# Patient Record
Sex: FEMALE | Race: WHITE | Employment: OTHER | URBAN - METROPOLITAN AREA
[De-identification: names, ages, dates, MRNs, and addresses within clinical notes are randomized per-mention and may not be internally consistent; named-entity substitution may affect disease eponyms.]

---

## 2017-04-27 ENCOUNTER — TELEPHONE (OUTPATIENT)
Dept: INTERNAL MEDICINE CLINIC | Age: 77
End: 2017-04-27

## 2017-05-01 ENCOUNTER — OFFICE VISIT (OUTPATIENT)
Dept: INTERNAL MEDICINE CLINIC | Age: 77
End: 2017-05-01

## 2017-05-01 ENCOUNTER — HOSPITAL ENCOUNTER (OUTPATIENT)
Dept: CT IMAGING | Age: 77
Discharge: HOME | End: 2017-05-01
Attending: INTERNAL MEDICINE | Admitting: INTERNAL MEDICINE

## 2017-05-01 VITALS
BODY MASS INDEX: 21.88 KG/M2 | WEIGHT: 119.6 LBS | DIASTOLIC BLOOD PRESSURE: 62 MMHG | TEMPERATURE: 98.1 F | SYSTOLIC BLOOD PRESSURE: 114 MMHG

## 2017-05-01 DIAGNOSIS — R10.12 LUQ PAIN: ICD-10-CM

## 2017-05-01 DIAGNOSIS — R10.13 EPIGASTRIC PAIN: Primary | ICD-10-CM

## 2017-05-01 DIAGNOSIS — R10.12 LUQ PAIN: Primary | ICD-10-CM

## 2017-05-01 DIAGNOSIS — R10.12 LEFT UPPER QUADRANT PAIN: ICD-10-CM

## 2017-05-01 LAB
A/G RATIO: 1.7 (ref 1.1–2.2)
ALBUMIN SERPL-MCNC: 4 G/DL (ref 3.4–5)
ALP BLD-CCNC: 57 U/L (ref 40–129)
ALT SERPL-CCNC: 18 U/L (ref 10–40)
AMYLASE: 107 U/L (ref 25–115)
ANION GAP SERPL CALCULATED.3IONS-SCNC: 9 MMOL/L (ref 3–16)
AST SERPL-CCNC: 17 U/L (ref 15–37)
BASOPHILS ABSOLUTE: 0.1 K/UL (ref 0–0.2)
BASOPHILS RELATIVE PERCENT: 1 %
BILIRUB SERPL-MCNC: 0.4 MG/DL (ref 0–1)
BUN BLDV-MCNC: 13 MG/DL (ref 7–20)
CALCIUM SERPL-MCNC: 9.4 MG/DL (ref 8.3–10.6)
CHLORIDE BLD-SCNC: 100 MMOL/L (ref 99–110)
CO2: 30 MMOL/L (ref 21–32)
CREAT SERPL-MCNC: 1 MG/DL (ref 0.6–1.2)
EOSINOPHILS ABSOLUTE: 0.3 K/UL (ref 0–0.6)
EOSINOPHILS RELATIVE PERCENT: 5.1 %
GFR AFRICAN AMERICAN: >60
GFR NON-AFRICAN AMERICAN: 54
GLOBULIN: 2.3 G/DL
GLUCOSE BLD-MCNC: 95 MG/DL (ref 70–99)
HCT VFR BLD CALC: 39.4 % (ref 36–48)
HEMOGLOBIN: 13.1 G/DL (ref 12–16)
LIPASE: 144 U/L (ref 13–60)
LYMPHOCYTES ABSOLUTE: 2 K/UL (ref 1–5.1)
LYMPHOCYTES RELATIVE PERCENT: 33.6 %
MCH RBC QN AUTO: 30.5 PG (ref 26–34)
MCHC RBC AUTO-ENTMCNC: 33.2 G/DL (ref 31–36)
MCV RBC AUTO: 91.6 FL (ref 80–100)
MONOCYTES ABSOLUTE: 0.4 K/UL (ref 0–1.3)
MONOCYTES RELATIVE PERCENT: 6.8 %
NEUTROPHILS ABSOLUTE: 3.1 K/UL (ref 1.7–7.7)
NEUTROPHILS RELATIVE PERCENT: 53.5 %
PDW BLD-RTO: 12.4 % (ref 12.4–15.4)
PLATELET # BLD: 268 K/UL (ref 135–450)
PMV BLD AUTO: 7.6 FL (ref 5–10.5)
POTASSIUM SERPL-SCNC: 4.4 MMOL/L (ref 3.5–5.1)
RBC # BLD: 4.3 M/UL (ref 4–5.2)
SODIUM BLD-SCNC: 139 MMOL/L (ref 136–145)
TOTAL PROTEIN: 6.3 G/DL (ref 6.4–8.2)
WBC # BLD: 5.8 K/UL (ref 4–11)

## 2017-05-01 PROCEDURE — 99213 OFFICE O/P EST LOW 20 MIN: CPT | Performed by: INTERNAL MEDICINE

## 2017-05-01 RX ORDER — PANTOPRAZOLE SODIUM 40 MG/1
40 TABLET, DELAYED RELEASE ORAL DAILY
Qty: 30 TABLET | Refills: 3 | Status: SHIPPED | OUTPATIENT
Start: 2017-05-01 | End: 2018-03-06

## 2017-05-04 LAB — LIPASE: 35 U/L (ref 13–60)

## 2017-05-05 ENCOUNTER — TELEPHONE (OUTPATIENT)
Dept: INTERNAL MEDICINE CLINIC | Age: 77
End: 2017-05-05

## 2017-05-08 ENCOUNTER — TELEPHONE (OUTPATIENT)
Dept: INTERNAL MEDICINE CLINIC | Age: 77
End: 2017-05-08

## 2017-06-27 ENCOUNTER — OFFICE VISIT (OUTPATIENT)
Dept: INTERNAL MEDICINE CLINIC | Age: 77
End: 2017-06-27

## 2017-06-27 VITALS
RESPIRATION RATE: 16 BRPM | BODY MASS INDEX: 22.56 KG/M2 | HEART RATE: 78 BPM | HEIGHT: 62 IN | SYSTOLIC BLOOD PRESSURE: 112 MMHG | DIASTOLIC BLOOD PRESSURE: 60 MMHG | WEIGHT: 122.6 LBS

## 2017-06-27 DIAGNOSIS — R10.13 EPIGASTRIC PAIN: ICD-10-CM

## 2017-06-27 DIAGNOSIS — E78.00 PURE HYPERCHOLESTEROLEMIA: Primary | ICD-10-CM

## 2017-06-27 DIAGNOSIS — M54.50 CHRONIC MIDLINE LOW BACK PAIN WITHOUT SCIATICA: ICD-10-CM

## 2017-06-27 DIAGNOSIS — G89.29 CHRONIC MIDLINE LOW BACK PAIN WITHOUT SCIATICA: ICD-10-CM

## 2017-06-27 PROCEDURE — 99214 OFFICE O/P EST MOD 30 MIN: CPT | Performed by: INTERNAL MEDICINE

## 2017-06-27 RX ORDER — DICLOFENAC SODIUM 75 MG/1
TABLET, DELAYED RELEASE ORAL
Qty: 180 TABLET | Refills: 3 | Status: SHIPPED | OUTPATIENT
Start: 2017-06-27 | End: 2017-07-05 | Stop reason: SDUPTHER

## 2017-06-27 ASSESSMENT — PATIENT HEALTH QUESTIONNAIRE - PHQ9
2. FEELING DOWN, DEPRESSED OR HOPELESS: 0
SUM OF ALL RESPONSES TO PHQ9 QUESTIONS 1 & 2: 0
1. LITTLE INTEREST OR PLEASURE IN DOING THINGS: 0
SUM OF ALL RESPONSES TO PHQ QUESTIONS 1-9: 0

## 2017-07-06 RX ORDER — DICLOFENAC SODIUM 75 MG/1
TABLET, DELAYED RELEASE ORAL
Qty: 180 TABLET | Refills: 3 | Status: SHIPPED | OUTPATIENT
Start: 2017-07-06 | End: 2018-03-06

## 2017-09-12 ENCOUNTER — OFFICE VISIT (OUTPATIENT)
Dept: INTERNAL MEDICINE CLINIC | Age: 77
End: 2017-09-12

## 2017-09-12 VITALS
DIASTOLIC BLOOD PRESSURE: 60 MMHG | HEIGHT: 62 IN | RESPIRATION RATE: 16 BRPM | WEIGHT: 124.2 LBS | SYSTOLIC BLOOD PRESSURE: 130 MMHG | BODY MASS INDEX: 22.86 KG/M2 | HEART RATE: 66 BPM

## 2017-09-12 DIAGNOSIS — Z23 NEED FOR INFLUENZA VACCINATION: ICD-10-CM

## 2017-09-12 DIAGNOSIS — H81.11 BPPV (BENIGN PAROXYSMAL POSITIONAL VERTIGO), RIGHT: Primary | ICD-10-CM

## 2017-09-12 PROCEDURE — 99213 OFFICE O/P EST LOW 20 MIN: CPT | Performed by: INTERNAL MEDICINE

## 2017-09-12 PROCEDURE — G0008 ADMIN INFLUENZA VIRUS VAC: HCPCS | Performed by: INTERNAL MEDICINE

## 2017-09-12 PROCEDURE — 90662 IIV NO PRSV INCREASED AG IM: CPT | Performed by: INTERNAL MEDICINE

## 2017-09-12 RX ORDER — MECLIZINE HYDROCHLORIDE 25 MG/1
25 TABLET ORAL 3 TIMES DAILY PRN
Qty: 30 TABLET | Refills: 1 | Status: SHIPPED | OUTPATIENT
Start: 2017-09-12 | End: 2017-11-06 | Stop reason: SDUPTHER

## 2017-11-06 RX ORDER — MECLIZINE HYDROCHLORIDE 25 MG/1
TABLET ORAL
Qty: 30 TABLET | Refills: 0 | Status: SHIPPED | OUTPATIENT
Start: 2017-11-06 | End: 2018-03-06

## 2017-11-20 ENCOUNTER — OFFICE VISIT (OUTPATIENT)
Dept: INTERNAL MEDICINE CLINIC | Age: 77
End: 2017-11-20

## 2017-11-20 VITALS
WEIGHT: 123.2 LBS | BODY MASS INDEX: 22.67 KG/M2 | RESPIRATION RATE: 16 BRPM | HEIGHT: 62 IN | HEART RATE: 72 BPM | DIASTOLIC BLOOD PRESSURE: 80 MMHG | SYSTOLIC BLOOD PRESSURE: 130 MMHG

## 2017-11-20 DIAGNOSIS — M54.12 CERVICAL RADICULOPATHY: Primary | ICD-10-CM

## 2017-11-20 PROCEDURE — 99213 OFFICE O/P EST LOW 20 MIN: CPT | Performed by: INTERNAL MEDICINE

## 2017-11-20 PROCEDURE — 1123F ACP DISCUSS/DSCN MKR DOCD: CPT | Performed by: INTERNAL MEDICINE

## 2017-11-20 PROCEDURE — G8399 PT W/DXA RESULTS DOCUMENT: HCPCS | Performed by: INTERNAL MEDICINE

## 2017-11-20 PROCEDURE — G8484 FLU IMMUNIZE NO ADMIN: HCPCS | Performed by: INTERNAL MEDICINE

## 2017-11-20 PROCEDURE — 1090F PRES/ABSN URINE INCON ASSESS: CPT | Performed by: INTERNAL MEDICINE

## 2017-11-20 PROCEDURE — 4040F PNEUMOC VAC/ADMIN/RCVD: CPT | Performed by: INTERNAL MEDICINE

## 2017-11-20 PROCEDURE — 1036F TOBACCO NON-USER: CPT | Performed by: INTERNAL MEDICINE

## 2017-11-20 PROCEDURE — G8420 CALC BMI NORM PARAMETERS: HCPCS | Performed by: INTERNAL MEDICINE

## 2017-11-20 PROCEDURE — 3017F COLORECTAL CA SCREEN DOC REV: CPT | Performed by: INTERNAL MEDICINE

## 2017-11-20 PROCEDURE — G8427 DOCREV CUR MEDS BY ELIG CLIN: HCPCS | Performed by: INTERNAL MEDICINE

## 2017-11-20 RX ORDER — METHYLPREDNISOLONE 4 MG/1
TABLET ORAL
Qty: 1 KIT | Refills: 0 | Status: SHIPPED | OUTPATIENT
Start: 2017-11-20 | End: 2017-11-26

## 2017-11-20 NOTE — PROGRESS NOTES
Subjective:      Patient ID: Esha Lopez is a 78 y.o. female. HPI  Woke up one week ago with right neck and shoulder pain. It goes from the upper neck behind her ear, then radiates into her shoulder and upper arm, with tingling into her right hand. Digits 2-4 are tingling the most  The arm does not feel weak. Increased pain with certain movements. Review of Systems   All other systems reviewed and are negative. Current Outpatient Prescriptions on File Prior to Visit   Medication Sig Dispense Refill    diclofenac (VOLTAREN) 75 MG EC tablet TAKE 1 TABLET TWICE DAILY 180 tablet 3    Calcium Carbonate-Vitamin D 600-400 MG-UNIT TABS Take  by mouth. One po bid       meclizine (ANTIVERT) 25 MG tablet TAKE 1 TABLET BY MOUTH THREE TIMES DAILY AS NEEDED FOR DIZZINESS 30 tablet 0    pantoprazole (PROTONIX) 40 MG tablet Take 1 tablet by mouth daily 30 tablet 3    Omega-3 Fatty Acids (FISH OIL) 1000 MG CAPS Take 1,000 mg by mouth daily       No current facility-administered medications on file prior to visit. Objective:   Physical Exam   Neurological:   Reflex Scores:       Tricep reflexes are 2+ on the right side and 2+ on the left side. Bicep reflexes are 2+ on the right side and 2+ on the left side. Brachioradialis reflexes are 2+ on the right side and 2+ on the left side. neck with limited ROM due to pain, mild right neck tenderness    Assessment:      Right neck pain/cervicacl radiculopathy-  MDP and consider MRI if not improving.   No loss or strength or reflexes yet      Plan:      As above

## 2017-12-21 ENCOUNTER — OFFICE VISIT (OUTPATIENT)
Dept: INTERNAL MEDICINE CLINIC | Age: 77
End: 2017-12-21

## 2017-12-21 VITALS
RESPIRATION RATE: 16 BRPM | HEART RATE: 82 BPM | SYSTOLIC BLOOD PRESSURE: 110 MMHG | BODY MASS INDEX: 22.89 KG/M2 | WEIGHT: 124.4 LBS | HEIGHT: 62 IN | DIASTOLIC BLOOD PRESSURE: 60 MMHG

## 2017-12-21 DIAGNOSIS — M54.12 CERVICAL RADICULOPATHY: Primary | ICD-10-CM

## 2017-12-21 PROCEDURE — 1036F TOBACCO NON-USER: CPT | Performed by: INTERNAL MEDICINE

## 2017-12-21 PROCEDURE — G8420 CALC BMI NORM PARAMETERS: HCPCS | Performed by: INTERNAL MEDICINE

## 2017-12-21 PROCEDURE — 1090F PRES/ABSN URINE INCON ASSESS: CPT | Performed by: INTERNAL MEDICINE

## 2017-12-21 PROCEDURE — 4040F PNEUMOC VAC/ADMIN/RCVD: CPT | Performed by: INTERNAL MEDICINE

## 2017-12-21 PROCEDURE — 1123F ACP DISCUSS/DSCN MKR DOCD: CPT | Performed by: INTERNAL MEDICINE

## 2017-12-21 PROCEDURE — G8484 FLU IMMUNIZE NO ADMIN: HCPCS | Performed by: INTERNAL MEDICINE

## 2017-12-21 PROCEDURE — 99213 OFFICE O/P EST LOW 20 MIN: CPT | Performed by: INTERNAL MEDICINE

## 2017-12-21 PROCEDURE — G8427 DOCREV CUR MEDS BY ELIG CLIN: HCPCS | Performed by: INTERNAL MEDICINE

## 2017-12-21 PROCEDURE — 3017F COLORECTAL CA SCREEN DOC REV: CPT | Performed by: INTERNAL MEDICINE

## 2017-12-21 PROCEDURE — G8399 PT W/DXA RESULTS DOCUMENT: HCPCS | Performed by: INTERNAL MEDICINE

## 2017-12-21 RX ORDER — METHYLPREDNISOLONE 4 MG/1
TABLET ORAL
Qty: 1 KIT | Refills: 0 | Status: SHIPPED | OUTPATIENT
Start: 2017-12-21 | End: 2017-12-27

## 2018-01-02 ENCOUNTER — TELEPHONE (OUTPATIENT)
Dept: INTERNAL MEDICINE CLINIC | Age: 78
End: 2018-01-02

## 2018-01-02 DIAGNOSIS — M54.12 CERVICAL RADICULOPATHY: Primary | ICD-10-CM

## 2018-01-05 ENCOUNTER — HOSPITAL ENCOUNTER (OUTPATIENT)
Dept: MRI IMAGING | Age: 78
Discharge: HOME | End: 2018-01-05
Attending: INTERNAL MEDICINE | Admitting: INTERNAL MEDICINE

## 2018-01-05 DIAGNOSIS — M54.12 CERVICAL RADICULOPATHY: ICD-10-CM

## 2018-01-05 DIAGNOSIS — M54.12 RADICULOPATHY OF CERVICAL REGION: ICD-10-CM

## 2018-01-08 ENCOUNTER — TELEPHONE (OUTPATIENT)
Dept: INTERNAL MEDICINE CLINIC | Age: 78
End: 2018-01-08

## 2018-01-25 ENCOUNTER — HOSPITAL ENCOUNTER (OUTPATIENT)
Dept: CT IMAGING | Age: 78
Discharge: HOME | End: 2018-01-25
Attending: NEUROLOGICAL SURGERY | Admitting: NEUROLOGICAL SURGERY

## 2018-01-25 DIAGNOSIS — M54.12 RADICULOPATHY OF CERVICAL REGION: ICD-10-CM

## 2018-01-25 DIAGNOSIS — M54.2 PAIN, NECK: ICD-10-CM

## 2018-03-06 ENCOUNTER — OFFICE VISIT (OUTPATIENT)
Dept: INTERNAL MEDICINE CLINIC | Age: 78
End: 2018-03-06

## 2018-03-06 VITALS
SYSTOLIC BLOOD PRESSURE: 112 MMHG | HEART RATE: 80 BPM | BODY MASS INDEX: 22.52 KG/M2 | RESPIRATION RATE: 16 BRPM | HEIGHT: 62 IN | WEIGHT: 122.4 LBS | DIASTOLIC BLOOD PRESSURE: 80 MMHG

## 2018-03-06 DIAGNOSIS — M48.02 CERVICAL SPINAL STENOSIS: Primary | ICD-10-CM

## 2018-03-06 DIAGNOSIS — Z01.818 PREOP EXAMINATION: ICD-10-CM

## 2018-03-06 PROCEDURE — 1123F ACP DISCUSS/DSCN MKR DOCD: CPT | Performed by: INTERNAL MEDICINE

## 2018-03-06 PROCEDURE — 1090F PRES/ABSN URINE INCON ASSESS: CPT | Performed by: INTERNAL MEDICINE

## 2018-03-06 PROCEDURE — 3017F COLORECTAL CA SCREEN DOC REV: CPT | Performed by: INTERNAL MEDICINE

## 2018-03-06 PROCEDURE — G8427 DOCREV CUR MEDS BY ELIG CLIN: HCPCS | Performed by: INTERNAL MEDICINE

## 2018-03-06 PROCEDURE — G8399 PT W/DXA RESULTS DOCUMENT: HCPCS | Performed by: INTERNAL MEDICINE

## 2018-03-06 PROCEDURE — G8420 CALC BMI NORM PARAMETERS: HCPCS | Performed by: INTERNAL MEDICINE

## 2018-03-06 PROCEDURE — 4040F PNEUMOC VAC/ADMIN/RCVD: CPT | Performed by: INTERNAL MEDICINE

## 2018-03-06 PROCEDURE — G8484 FLU IMMUNIZE NO ADMIN: HCPCS | Performed by: INTERNAL MEDICINE

## 2018-03-06 PROCEDURE — 1036F TOBACCO NON-USER: CPT | Performed by: INTERNAL MEDICINE

## 2018-03-06 PROCEDURE — 99214 OFFICE O/P EST MOD 30 MIN: CPT | Performed by: INTERNAL MEDICINE

## 2018-03-06 ASSESSMENT — ENCOUNTER SYMPTOMS
WHEEZING: 0
SHORTNESS OF BREATH: 0
COUGH: 0

## 2018-07-05 ENCOUNTER — OFFICE VISIT (OUTPATIENT)
Dept: INTERNAL MEDICINE CLINIC | Age: 78
End: 2018-07-05

## 2018-07-05 VITALS
BODY MASS INDEX: 23.01 KG/M2 | RESPIRATION RATE: 16 BRPM | SYSTOLIC BLOOD PRESSURE: 118 MMHG | HEIGHT: 60 IN | OXYGEN SATURATION: 98 % | WEIGHT: 117.2 LBS | DIASTOLIC BLOOD PRESSURE: 78 MMHG | HEART RATE: 61 BPM

## 2018-07-05 DIAGNOSIS — E78.00 PURE HYPERCHOLESTEROLEMIA: Primary | ICD-10-CM

## 2018-07-05 DIAGNOSIS — G89.29 CHRONIC MIDLINE LOW BACK PAIN WITHOUT SCIATICA: ICD-10-CM

## 2018-07-05 DIAGNOSIS — M54.50 CHRONIC MIDLINE LOW BACK PAIN WITHOUT SCIATICA: ICD-10-CM

## 2018-07-05 PROCEDURE — 1123F ACP DISCUSS/DSCN MKR DOCD: CPT | Performed by: INTERNAL MEDICINE

## 2018-07-05 PROCEDURE — 99214 OFFICE O/P EST MOD 30 MIN: CPT | Performed by: INTERNAL MEDICINE

## 2018-07-05 PROCEDURE — 1036F TOBACCO NON-USER: CPT | Performed by: INTERNAL MEDICINE

## 2018-07-05 PROCEDURE — 1090F PRES/ABSN URINE INCON ASSESS: CPT | Performed by: INTERNAL MEDICINE

## 2018-07-05 PROCEDURE — G8427 DOCREV CUR MEDS BY ELIG CLIN: HCPCS | Performed by: INTERNAL MEDICINE

## 2018-07-05 PROCEDURE — 4040F PNEUMOC VAC/ADMIN/RCVD: CPT | Performed by: INTERNAL MEDICINE

## 2018-07-05 PROCEDURE — G8420 CALC BMI NORM PARAMETERS: HCPCS | Performed by: INTERNAL MEDICINE

## 2018-07-05 PROCEDURE — G8399 PT W/DXA RESULTS DOCUMENT: HCPCS | Performed by: INTERNAL MEDICINE

## 2018-07-05 ASSESSMENT — PATIENT HEALTH QUESTIONNAIRE - PHQ9
SUM OF ALL RESPONSES TO PHQ QUESTIONS 1-9: 0
SUM OF ALL RESPONSES TO PHQ QUESTIONS 1-9: 0
2. FEELING DOWN, DEPRESSED OR HOPELESS: 0
SUM OF ALL RESPONSES TO PHQ9 QUESTIONS 1 & 2: 0
1. LITTLE INTEREST OR PLEASURE IN DOING THINGS: 0
SUM OF ALL RESPONSES TO PHQ9 QUESTIONS 1 & 2: 0
2. FEELING DOWN, DEPRESSED OR HOPELESS: 0
1. LITTLE INTEREST OR PLEASURE IN DOING THINGS: 0

## 2018-07-11 LAB
A/G RATIO: 2.1 (ref 1.1–2.2)
ALBUMIN SERPL-MCNC: 4.6 G/DL (ref 3.4–5)
ALP BLD-CCNC: 76 U/L (ref 40–129)
ALT SERPL-CCNC: 16 U/L (ref 10–40)
ANION GAP SERPL CALCULATED.3IONS-SCNC: 14 MMOL/L (ref 3–16)
AST SERPL-CCNC: 17 U/L (ref 15–37)
BILIRUB SERPL-MCNC: 0.4 MG/DL (ref 0–1)
BUN BLDV-MCNC: 14 MG/DL (ref 7–20)
CALCIUM SERPL-MCNC: 10.1 MG/DL (ref 8.3–10.6)
CHLORIDE BLD-SCNC: 105 MMOL/L (ref 99–110)
CHOLESTEROL, TOTAL: 185 MG/DL (ref 0–199)
CO2: 25 MMOL/L (ref 21–32)
CREAT SERPL-MCNC: 1 MG/DL (ref 0.6–1.2)
GFR AFRICAN AMERICAN: >60
GFR NON-AFRICAN AMERICAN: 54
GLOBULIN: 2.2 G/DL
GLUCOSE BLD-MCNC: 94 MG/DL (ref 70–99)
HDLC SERPL-MCNC: 63 MG/DL (ref 40–60)
LDL CHOLESTEROL CALCULATED: 103 MG/DL
POTASSIUM SERPL-SCNC: 4.5 MMOL/L (ref 3.5–5.1)
SODIUM BLD-SCNC: 144 MMOL/L (ref 136–145)
TOTAL PROTEIN: 6.8 G/DL (ref 6.4–8.2)
TRIGL SERPL-MCNC: 94 MG/DL (ref 0–150)
TSH SERPL DL<=0.05 MIU/L-ACNC: 1.46 UIU/ML (ref 0.27–4.2)
VLDLC SERPL CALC-MCNC: 19 MG/DL

## 2018-07-13 ENCOUNTER — TELEPHONE (OUTPATIENT)
Dept: INTERNAL MEDICINE CLINIC | Age: 78
End: 2018-07-13

## 2019-03-27 ENCOUNTER — OFFICE VISIT (OUTPATIENT)
Dept: ORTHOPEDIC SURGERY | Age: 79
End: 2019-03-27
Payer: MEDICARE

## 2019-03-27 VITALS
SYSTOLIC BLOOD PRESSURE: 133 MMHG | HEART RATE: 75 BPM | WEIGHT: 116 LBS | RESPIRATION RATE: 16 BRPM | HEIGHT: 60 IN | BODY MASS INDEX: 22.78 KG/M2 | DIASTOLIC BLOOD PRESSURE: 83 MMHG

## 2019-03-27 DIAGNOSIS — M79.675 PAIN OF TOE OF LEFT FOOT: ICD-10-CM

## 2019-03-27 DIAGNOSIS — M21.612 BUNION OF LEFT FOOT: ICD-10-CM

## 2019-03-27 DIAGNOSIS — M20.42 HAMMERTOE OF SECOND TOE OF LEFT FOOT: ICD-10-CM

## 2019-03-27 PROCEDURE — G8428 CUR MEDS NOT DOCUMENT: HCPCS | Performed by: ORTHOPAEDIC SURGERY

## 2019-03-27 PROCEDURE — MISC235 BUDIN SPLINT 1: Performed by: ORTHOPAEDIC SURGERY

## 2019-03-27 PROCEDURE — 1090F PRES/ABSN URINE INCON ASSESS: CPT | Performed by: ORTHOPAEDIC SURGERY

## 2019-03-27 PROCEDURE — 29550 STRAPPING OF TOES: CPT | Performed by: ORTHOPAEDIC SURGERY

## 2019-03-27 PROCEDURE — G8484 FLU IMMUNIZE NO ADMIN: HCPCS | Performed by: ORTHOPAEDIC SURGERY

## 2019-03-27 PROCEDURE — G8420 CALC BMI NORM PARAMETERS: HCPCS | Performed by: ORTHOPAEDIC SURGERY

## 2019-03-27 PROCEDURE — 99203 OFFICE O/P NEW LOW 30 MIN: CPT | Performed by: ORTHOPAEDIC SURGERY

## 2019-03-27 NOTE — LETTER
[address] [facility]  Surgery Scheduling Form:      DEMOGRAPHICS:                                                                                                              .    Patient Name:  Venia Prader  Patient :  1940   Patient SS#:      Patient Phone:  [phone] (home)  Alt. Patient Phone:    Patient Address:  [address] [facility] [address]    PCP:  Collette Bustos MD  Insurance:    Payor/Plan Subscr  Sex Relation Sub. Ins. ID Effective Group Num   1. 1948 [facility] 1940 Female  [id number] 1/1/15 [id number]                                   [address] [facility]   Insurance ID Number:  DIAGNOSIS & PROCEDURE:                                                                                            .    Diagnosis:   Left foot bunion. Left foot 2nd hammertoe M20.12, M20.40  Operation:  Left foot bunion correction distal chevron and distal soft tissue release. Left foot 2nd hammertoe correction with smart toe, possible weil osteotomy. Angular deformity correction. [id number], 36830, 71113, [address]95, [facility] 26, [address]  Location:  [place]  Surgeon:  Charmayne Moos, MD    SCHEDULING INFORMATION:                                                                                         .    Surgeon's Scheduling Instruction:  elective  Requested Date:  2019 OR Time: 07:30am   Patient Arrival Time:  06:00amOR Time Required:  60  Minutes  Anesthesia:  General    SA Required:  Yes  Equipment:  Kel   Mini C-Arm:  Yes    Standard C-Arm:  No  Status:  Outpatient    PAT Required:  Yes  Latex Allergy:  unknown    Defibrilator or Pacemaker:  unknown  Isolation Precautions:  unknown  Comments:                       Mateo Velasquez MD 3/27/19   BILLING INFORMATION:                                                                                                    .    Procedure:       CPT Code Modifier                Pre-Certification:

## 2019-03-27 NOTE — PROGRESS NOTES
knee flexion. She has intact sensation and good pedal pulses.  She has good strength in all four planes, including eversion, and has mild tenderness on deep palpation over the great toe MPJ, with prominent medial eminence with bilateral bunion deformity. She has mild tenderness on deep palpation over the left 2nd toe PIP joint with fixed hammertoe deformity compared to the other side.  The ankles are stable to drawer test bilaterally, equally.  Ankle reflex 1+ bilaterally. IMAGING:  Xray's were reviewed.  3 views of the left foot taken in office today, and showed no acute fracture. Bilateral bunion with hallux rigidus on the right foot. There is 2nd toe hammertoe deformity. IMPRESSION:   1-Left bunion. 2-Left 2nd fixed hammertoe deformity. PLAN: I discussed with the patient the treatment options including both surgical and non-surgical treatment. We recommended stretching exercises of the calf which was taught to the patient today. She will take NSAIDS as needed. Use wide shoes. Budin splint. I believe she will benefit from surgical correction with PIP arthroplasty using smart toe implant and surgical correction with distal soft tissue release and distal Chevron osteotomy. I discussed the risks and benefits of surgery with the patient, including but not limited to infection, bleeding, pain, injury to nerves or blood vessels failure of the surgery and need for additional surgery. All the patient's questions were answered. We discussed an expected post-operative course. She  is understanding of this and wishes to proceed. At this point, she would like to try the Budin splint temporarily while she has CTS on her right hand and will call to schedule this after she recovers. Thank you very much for the kind consultation and allowing me to participate in this patient's care. I will continue to keep you apprised of her progress.             Procedures    Breg Budin Splint 1 $8 Patient was supplied a  Breg Budin Splint 1. This retail item was supplied to provide functional support of the affected area. Verbal and written instructions for the use of and application of this item were provided. The patient was educated and fit by a healthcare professional with expert knowledge and specialization in brace application. They were instructed to contact the office immediately should the equipment result in increased pain, decreased sensation, increased swelling or worsening of the condition.       NM ROYAING OF Lacy Thompson MD

## 2019-04-02 PROBLEM — M21.612 BUNION OF LEFT FOOT: Status: ACTIVE | Noted: 2019-04-02

## 2019-04-02 PROBLEM — M20.42 HAMMERTOE OF SECOND TOE OF LEFT FOOT: Status: ACTIVE | Noted: 2019-04-02

## 2019-04-17 ENCOUNTER — TELEPHONE (OUTPATIENT)
Dept: ORTHOPEDIC SURGERY | Age: 79
End: 2019-04-17

## 2019-04-17 NOTE — TELEPHONE ENCOUNTER
Patient states that she was told she was supposed to receive a confirmation call , two of them regarding her sx with [facility] 4/22-and confirm the time    Patient is getting  Little nervous because she has not received call yet and it is already Weds.     She wants to know what is going on , please call patient at [phone]

## 2019-04-18 ENCOUNTER — ANESTHESIA EVENT (OUTPATIENT)
Dept: OPERATING ROOM | Age: 79
End: 2019-04-18
Payer: MEDICARE

## 2019-04-18 RX ORDER — ACETAMINOPHEN 500 MG
500 TABLET ORAL PRN
COMMUNITY

## 2019-04-18 NOTE — TELEPHONE ENCOUNTER
Pt called. She is going out of town tomorrow and will not be back until Sunday. All Information has been confirmed. Pt given number for pre reg,and pre admission will call her today. I will call patient back later in afternoon to make sure all is well.

## 2019-04-22 ENCOUNTER — ANESTHESIA (OUTPATIENT)
Dept: OPERATING ROOM | Age: 79
End: 2019-04-22
Payer: MEDICARE

## 2019-04-22 ENCOUNTER — HOSPITAL ENCOUNTER (OUTPATIENT)
Age: 79
Setting detail: OUTPATIENT SURGERY
Discharge: HOME | End: 2019-04-22
Attending: ORTHOPAEDIC SURGERY | Admitting: ORTHOPAEDIC SURGERY
Payer: MEDICARE

## 2019-04-22 ENCOUNTER — APPOINTMENT (OUTPATIENT)
Dept: GENERAL RADIOLOGY | Age: 79
End: 2019-04-22
Attending: ORTHOPAEDIC SURGERY
Payer: MEDICARE

## 2019-04-22 VITALS
TEMPERATURE: 97.2 F | DIASTOLIC BLOOD PRESSURE: 66 MMHG | RESPIRATION RATE: 2 BRPM | OXYGEN SATURATION: 100 % | SYSTOLIC BLOOD PRESSURE: 120 MMHG

## 2019-04-22 VITALS
RESPIRATION RATE: 12 BRPM | HEART RATE: 72 BPM | OXYGEN SATURATION: 98 % | HEIGHT: 60 IN | DIASTOLIC BLOOD PRESSURE: 74 MMHG | SYSTOLIC BLOOD PRESSURE: 142 MMHG | TEMPERATURE: 98 F | WEIGHT: 114.7 LBS | BODY MASS INDEX: 22.52 KG/M2

## 2019-04-22 DIAGNOSIS — M21.612 BUNION OF LEFT FOOT: Primary | ICD-10-CM

## 2019-04-22 DIAGNOSIS — M20.42 HAMMERTOE OF SECOND TOE OF LEFT FOOT: ICD-10-CM

## 2019-04-22 PROCEDURE — 28299 COR HLX VLGS DOUBLE OSTEOT: CPT | Performed by: ORTHOPAEDIC SURGERY

## 2019-04-22 PROCEDURE — 28285 REPAIR OF HAMMERTOE: CPT | Performed by: ORTHOPAEDIC SURGERY

## 2019-04-22 PROCEDURE — 28313 REPAIR DEFORMITY OF TOE: CPT | Performed by: ORTHOPAEDIC SURGERY

## 2019-04-22 PROCEDURE — 3700000001 HC ADD 15 MINUTES (ANESTHESIA): Performed by: ORTHOPAEDIC SURGERY

## 2019-04-22 PROCEDURE — 2500000003 HC RX 250 WO HCPCS: Performed by: NURSE ANESTHETIST, CERTIFIED REGISTERED

## 2019-04-22 PROCEDURE — C1776 JOINT DEVICE (IMPLANTABLE): HCPCS | Performed by: ORTHOPAEDIC SURGERY

## 2019-04-22 PROCEDURE — 2580000003 HC RX 258: Performed by: ANESTHESIOLOGY

## 2019-04-22 PROCEDURE — 7100000010 HC PHASE II RECOVERY - FIRST 15 MIN: Performed by: ORTHOPAEDIC SURGERY

## 2019-04-22 PROCEDURE — 3600000004 HC SURGERY LEVEL 4 BASE: Performed by: ORTHOPAEDIC SURGERY

## 2019-04-22 PROCEDURE — 2720000010 HC SURG SUPPLY STERILE: Performed by: ORTHOPAEDIC SURGERY

## 2019-04-22 PROCEDURE — 2500000003 HC RX 250 WO HCPCS: Performed by: ORTHOPAEDIC SURGERY

## 2019-04-22 PROCEDURE — 2709999900 HC NON-CHARGEABLE SUPPLY: Performed by: ORTHOPAEDIC SURGERY

## 2019-04-22 PROCEDURE — 7100000000 HC PACU RECOVERY - FIRST 15 MIN: Performed by: ORTHOPAEDIC SURGERY

## 2019-04-22 PROCEDURE — 6360000002 HC RX W HCPCS: Performed by: ORTHOPAEDIC SURGERY

## 2019-04-22 PROCEDURE — C1713 ANCHOR/SCREW BN/BN,TIS/BN: HCPCS | Performed by: ORTHOPAEDIC SURGERY

## 2019-04-22 PROCEDURE — 7100000001 HC PACU RECOVERY - ADDTL 15 MIN: Performed by: ORTHOPAEDIC SURGERY

## 2019-04-22 PROCEDURE — 2580000003 HC RX 258: Performed by: ORTHOPAEDIC SURGERY

## 2019-04-22 PROCEDURE — 73660 X-RAY EXAM OF TOE(S): CPT

## 2019-04-22 PROCEDURE — 7100000011 HC PHASE II RECOVERY - ADDTL 15 MIN: Performed by: ORTHOPAEDIC SURGERY

## 2019-04-22 PROCEDURE — 28299 COR HLX VLGS DOUBLE OSTEOT: CPT | Performed by: NURSE PRACTITIONER

## 2019-04-22 PROCEDURE — 6360000002 HC RX W HCPCS: Performed by: NURSE ANESTHETIST, CERTIFIED REGISTERED

## 2019-04-22 PROCEDURE — 6370000000 HC RX 637 (ALT 250 FOR IP): Performed by: ORTHOPAEDIC SURGERY

## 2019-04-22 PROCEDURE — 3209999900 FLUORO FOR SURGICAL PROCEDURES

## 2019-04-22 PROCEDURE — 6360000002 HC RX W HCPCS: Performed by: ANESTHESIOLOGY

## 2019-04-22 PROCEDURE — 3700000000 HC ANESTHESIA ATTENDED CARE: Performed by: ORTHOPAEDIC SURGERY

## 2019-04-22 PROCEDURE — 3600000014 HC SURGERY LEVEL 4 ADDTL 15MIN: Performed by: ORTHOPAEDIC SURGERY

## 2019-04-22 DEVICE — HEADLESS SCREW
Type: IMPLANTABLE DEVICE | Site: FOOT | Status: FUNCTIONAL
Brand: MINI

## 2019-04-22 DEVICE — INTRAMEDULLARY ARTHRODESIS IMPLANT
Type: IMPLANTABLE DEVICE | Site: FOOT | Status: FUNCTIONAL
Brand: SMART TOE

## 2019-04-22 RX ORDER — GLYCOPYRROLATE 0.2 MG/ML
INJECTION INTRAMUSCULAR; INTRAVENOUS PRN
Status: DISCONTINUED | OUTPATIENT
Start: 2019-04-22 | End: 2019-04-22 | Stop reason: SDUPTHER

## 2019-04-22 RX ORDER — DEXAMETHASONE SODIUM PHOSPHATE 4 MG/ML
INJECTION, SOLUTION INTRA-ARTICULAR; INTRALESIONAL; INTRAMUSCULAR; INTRAVENOUS; SOFT TISSUE PRN
Status: DISCONTINUED | OUTPATIENT
Start: 2019-04-22 | End: 2019-04-22 | Stop reason: SDUPTHER

## 2019-04-22 RX ORDER — BUPIVACAINE HYDROCHLORIDE 5 MG/ML
INJECTION, SOLUTION EPIDURAL; INTRACAUDAL
Status: COMPLETED | OUTPATIENT
Start: 2019-04-22 | End: 2019-04-22

## 2019-04-22 RX ORDER — PROPOFOL 10 MG/ML
INJECTION, EMULSION INTRAVENOUS PRN
Status: DISCONTINUED | OUTPATIENT
Start: 2019-04-22 | End: 2019-04-22 | Stop reason: SDUPTHER

## 2019-04-22 RX ORDER — CEPHALEXIN 500 MG/1
500 CAPSULE ORAL 4 TIMES DAILY
Qty: 20 CAPSULE | Refills: 0 | Status: SHIPPED | OUTPATIENT
Start: 2019-04-22 | End: 2019-04-27

## 2019-04-22 RX ORDER — KETOROLAC TROMETHAMINE 30 MG/ML
INJECTION, SOLUTION INTRAMUSCULAR; INTRAVENOUS PRN
Status: DISCONTINUED | OUTPATIENT
Start: 2019-04-22 | End: 2019-04-22 | Stop reason: SDUPTHER

## 2019-04-22 RX ORDER — OXYCODONE HYDROCHLORIDE AND ACETAMINOPHEN 5; 325 MG/1; MG/1
1 TABLET ORAL ONCE
Status: DISCONTINUED | OUTPATIENT
Start: 2019-04-22 | End: 2019-04-22 | Stop reason: HOSPADM

## 2019-04-22 RX ORDER — OXYCODONE HYDROCHLORIDE AND ACETAMINOPHEN 5; 325 MG/1; MG/1
1 TABLET ORAL EVERY 6 HOURS PRN
Qty: 20 TABLET | Refills: 0 | Status: SHIPPED | OUTPATIENT
Start: 2019-04-22 | End: 2019-04-29

## 2019-04-22 RX ORDER — SODIUM CHLORIDE 9 MG/ML
INJECTION, SOLUTION INTRAVENOUS CONTINUOUS
Status: DISCONTINUED | OUTPATIENT
Start: 2019-04-22 | End: 2019-04-22 | Stop reason: HOSPADM

## 2019-04-22 RX ORDER — SODIUM CHLORIDE 0.9 % (FLUSH) 0.9 %
10 SYRINGE (ML) INJECTION EVERY 12 HOURS SCHEDULED
Status: DISCONTINUED | OUTPATIENT
Start: 2019-04-22 | End: 2019-04-22 | Stop reason: HOSPADM

## 2019-04-22 RX ORDER — MAGNESIUM HYDROXIDE 1200 MG/15ML
LIQUID ORAL CONTINUOUS PRN
Status: COMPLETED | OUTPATIENT
Start: 2019-04-22 | End: 2019-04-22

## 2019-04-22 RX ORDER — FENTANYL CITRATE 50 UG/ML
25 INJECTION, SOLUTION INTRAMUSCULAR; INTRAVENOUS EVERY 5 MIN PRN
Status: DISCONTINUED | OUTPATIENT
Start: 2019-04-22 | End: 2019-04-22 | Stop reason: HOSPADM

## 2019-04-22 RX ORDER — ONDANSETRON 2 MG/ML
INJECTION INTRAMUSCULAR; INTRAVENOUS PRN
Status: DISCONTINUED | OUTPATIENT
Start: 2019-04-22 | End: 2019-04-22 | Stop reason: SDUPTHER

## 2019-04-22 RX ORDER — OXYCODONE HYDROCHLORIDE AND ACETAMINOPHEN 5; 325 MG/1; MG/1
1 TABLET ORAL ONCE
Status: COMPLETED | OUTPATIENT
Start: 2019-04-22 | End: 2019-04-22

## 2019-04-22 RX ORDER — FENTANYL CITRATE 50 UG/ML
INJECTION, SOLUTION INTRAMUSCULAR; INTRAVENOUS PRN
Status: DISCONTINUED | OUTPATIENT
Start: 2019-04-22 | End: 2019-04-22 | Stop reason: SDUPTHER

## 2019-04-22 RX ORDER — LIDOCAINE HYDROCHLORIDE 20 MG/ML
INJECTION, SOLUTION EPIDURAL; INFILTRATION; INTRACAUDAL; PERINEURAL PRN
Status: DISCONTINUED | OUTPATIENT
Start: 2019-04-22 | End: 2019-04-22 | Stop reason: SDUPTHER

## 2019-04-22 RX ORDER — SODIUM CHLORIDE 0.9 % (FLUSH) 0.9 %
10 SYRINGE (ML) INJECTION PRN
Status: DISCONTINUED | OUTPATIENT
Start: 2019-04-22 | End: 2019-04-22 | Stop reason: HOSPADM

## 2019-04-22 RX ORDER — LABETALOL HYDROCHLORIDE 5 MG/ML
5 INJECTION, SOLUTION INTRAVENOUS EVERY 10 MIN PRN
Status: DISCONTINUED | OUTPATIENT
Start: 2019-04-22 | End: 2019-04-22 | Stop reason: HOSPADM

## 2019-04-22 RX ORDER — PROMETHAZINE HYDROCHLORIDE 25 MG/ML
6.25 INJECTION, SOLUTION INTRAMUSCULAR; INTRAVENOUS
Status: DISCONTINUED | OUTPATIENT
Start: 2019-04-22 | End: 2019-04-22 | Stop reason: HOSPADM

## 2019-04-22 RX ADMIN — DEXAMETHASONE SODIUM PHOSPHATE 6 MG: 4 INJECTION, SOLUTION INTRAMUSCULAR; INTRAVENOUS at 08:15

## 2019-04-22 RX ADMIN — GLYCOPYRROLATE 0.1 MG: 0.2 INJECTION, SOLUTION INTRAMUSCULAR; INTRAVENOUS at 08:04

## 2019-04-22 RX ADMIN — FENTANYL CITRATE 25 MCG: 50 INJECTION, SOLUTION INTRAMUSCULAR; INTRAVENOUS at 09:54

## 2019-04-22 RX ADMIN — KETOROLAC TROMETHAMINE 15 MG: 30 INJECTION, SOLUTION INTRAMUSCULAR at 09:02

## 2019-04-22 RX ADMIN — ONDANSETRON 4 MG: 2 INJECTION INTRAMUSCULAR; INTRAVENOUS at 09:02

## 2019-04-22 RX ADMIN — FENTANYL CITRATE 25 MCG: 50 INJECTION INTRAMUSCULAR; INTRAVENOUS at 08:04

## 2019-04-22 RX ADMIN — PROPOFOL 150 MG: 10 INJECTION, EMULSION INTRAVENOUS at 08:08

## 2019-04-22 RX ADMIN — SODIUM CHLORIDE: 9 INJECTION, SOLUTION INTRAVENOUS at 06:46

## 2019-04-22 RX ADMIN — FENTANYL CITRATE 25 MCG: 50 INJECTION, SOLUTION INTRAMUSCULAR; INTRAVENOUS at 10:39

## 2019-04-22 RX ADMIN — FENTANYL CITRATE 25 MCG: 50 INJECTION INTRAMUSCULAR; INTRAVENOUS at 09:02

## 2019-04-22 RX ADMIN — OXYCODONE HYDROCHLORIDE AND ACETAMINOPHEN 1 TABLET: 5; 325 TABLET ORAL at 11:56

## 2019-04-22 RX ADMIN — LIDOCAINE HYDROCHLORIDE 4 ML: 20 INJECTION, SOLUTION EPIDURAL; INFILTRATION; INTRACAUDAL; PERINEURAL at 08:08

## 2019-04-22 RX ADMIN — FENTANYL CITRATE 25 MCG: 50 INJECTION INTRAMUSCULAR; INTRAVENOUS at 08:08

## 2019-04-22 RX ADMIN — Medication 2 G: at 08:04

## 2019-04-22 RX ADMIN — FENTANYL CITRATE 25 MCG: 50 INJECTION INTRAMUSCULAR; INTRAVENOUS at 09:31

## 2019-04-22 RX ADMIN — SODIUM CHLORIDE: 9 INJECTION, SOLUTION INTRAVENOUS at 09:03

## 2019-04-22 ASSESSMENT — PULMONARY FUNCTION TESTS
PIF_VALUE: 2
PIF_VALUE: 2
PIF_VALUE: 11
PIF_VALUE: 2
PIF_VALUE: 11
PIF_VALUE: 2
PIF_VALUE: 2
PIF_VALUE: 11
PIF_VALUE: 2
PIF_VALUE: 11
PIF_VALUE: 9
PIF_VALUE: 11
PIF_VALUE: 11
PIF_VALUE: 6
PIF_VALUE: 2
PIF_VALUE: 2
PIF_VALUE: 12
PIF_VALUE: 2
PIF_VALUE: 12
PIF_VALUE: 11
PIF_VALUE: 11
PIF_VALUE: 6
PIF_VALUE: 11
PIF_VALUE: 12
PIF_VALUE: 11
PIF_VALUE: 6
PIF_VALUE: 11
PIF_VALUE: 12
PIF_VALUE: 5
PIF_VALUE: 11
PIF_VALUE: 11
PIF_VALUE: 12
PIF_VALUE: 11
PIF_VALUE: 2
PIF_VALUE: 12
PIF_VALUE: 6
PIF_VALUE: 11
PIF_VALUE: 2
PIF_VALUE: 11
PIF_VALUE: 11
PIF_VALUE: 0
PIF_VALUE: 2
PIF_VALUE: 11
PIF_VALUE: 11
PIF_VALUE: 3
PIF_VALUE: 11
PIF_VALUE: 2
PIF_VALUE: 11
PIF_VALUE: 11
PIF_VALUE: 1
PIF_VALUE: 11
PIF_VALUE: 11
PIF_VALUE: 6
PIF_VALUE: 6
PIF_VALUE: 0
PIF_VALUE: 11
PIF_VALUE: 11
PIF_VALUE: 2
PIF_VALUE: 2
PIF_VALUE: 11
PIF_VALUE: 11
PIF_VALUE: 12
PIF_VALUE: 12
PIF_VALUE: 11
PIF_VALUE: 12
PIF_VALUE: 11
PIF_VALUE: 6
PIF_VALUE: 11
PIF_VALUE: 11
PIF_VALUE: 0
PIF_VALUE: 11
PIF_VALUE: 12
PIF_VALUE: 6
PIF_VALUE: 6

## 2019-04-22 ASSESSMENT — PAIN DESCRIPTION - LOCATION
LOCATION: FOOT;TOE (COMMENT WHICH ONE)
LOCATION: FOOT
LOCATION: TOE (COMMENT WHICH ONE)
LOCATION: FOOT
LOCATION: TOE (COMMENT WHICH ONE)

## 2019-04-22 ASSESSMENT — PAIN DESCRIPTION - ORIENTATION
ORIENTATION: LEFT

## 2019-04-22 ASSESSMENT — PAIN - FUNCTIONAL ASSESSMENT
PAIN_FUNCTIONAL_ASSESSMENT: PREVENTS OR INTERFERES SOME ACTIVE ACTIVITIES AND ADLS
PAIN_FUNCTIONAL_ASSESSMENT: PREVENTS OR INTERFERES SOME ACTIVE ACTIVITIES AND ADLS
PAIN_FUNCTIONAL_ASSESSMENT: 0-10
PAIN_FUNCTIONAL_ASSESSMENT: PREVENTS OR INTERFERES SOME ACTIVE ACTIVITIES AND ADLS
PAIN_FUNCTIONAL_ASSESSMENT: PREVENTS OR INTERFERES SOME ACTIVE ACTIVITIES AND ADLS
PAIN_FUNCTIONAL_ASSESSMENT: ACTIVITIES ARE NOT PREVENTED
PAIN_FUNCTIONAL_ASSESSMENT: PREVENTS OR INTERFERES SOME ACTIVE ACTIVITIES AND ADLS
PAIN_FUNCTIONAL_ASSESSMENT: PREVENTS OR INTERFERES SOME ACTIVE ACTIVITIES AND ADLS

## 2019-04-22 ASSESSMENT — PAIN DESCRIPTION - FREQUENCY
FREQUENCY: CONTINUOUS
FREQUENCY: INTERMITTENT
FREQUENCY: INTERMITTENT
FREQUENCY: CONTINUOUS
FREQUENCY: CONTINUOUS
FREQUENCY: INTERMITTENT
FREQUENCY: CONTINUOUS

## 2019-04-22 ASSESSMENT — PAIN DESCRIPTION - DESCRIPTORS
DESCRIPTORS: ACHING

## 2019-04-22 ASSESSMENT — PAIN SCALES - GENERAL
PAINLEVEL_OUTOF10: 5
PAINLEVEL_OUTOF10: 5
PAINLEVEL_OUTOF10: 3
PAINLEVEL_OUTOF10: 5
PAINLEVEL_OUTOF10: 3
PAINLEVEL_OUTOF10: 5
PAINLEVEL_OUTOF10: 5
PAINLEVEL_OUTOF10: 2

## 2019-04-22 ASSESSMENT — PAIN DESCRIPTION - ONSET
ONSET: ON-GOING
ONSET: AWAKENED FROM SLEEP
ONSET: ON-GOING
ONSET: ON-GOING

## 2019-04-22 ASSESSMENT — PAIN DESCRIPTION - PROGRESSION
CLINICAL_PROGRESSION: NOT CHANGED
CLINICAL_PROGRESSION: GRADUALLY WORSENING
CLINICAL_PROGRESSION: RAPIDLY IMPROVING
CLINICAL_PROGRESSION: GRADUALLY WORSENING
CLINICAL_PROGRESSION: GRADUALLY IMPROVING
CLINICAL_PROGRESSION: NOT CHANGED
CLINICAL_PROGRESSION: RAPIDLY IMPROVING

## 2019-04-22 ASSESSMENT — PAIN DESCRIPTION - PAIN TYPE
TYPE: SURGICAL PAIN

## 2019-04-22 NOTE — PROGRESS NOTES
Left foot dressing dry and intact with stable neurovascular checks to LLE. Toes to left foot pink warm and dry. Pain level 3 of 10 and tolerable. Moving all extremities to command. Patient stable to transfer to ACU for phase II. VSS. IV patent.  Monitor in SR.
Patient C/O surgical pain at 5 of 10 and medicated per order. See MAR. VSS. IV patent. Monitor in SR. Left foot dressing unchanged with stable neurovascular checks to LLE. FOB elevated and ice pack on.
Patient C/O surgical pain to left foot toes at 5 of 10 and medicated per order. See MAR. VSS. IV patent. Left foot toes pink warm and dry with brisk cap refill.
Patient admitted to PACU from OR. Patient drowsy, arouses to name. Resp easy unlabored on room air O2 with SAO2 97%. Monitor in SR. Left foot dressing dry and intact with surgical shoe intact. Ice applied. Toes to left foot 1-4 visible and pink warm and dry. FOB elevated and left foot on pillow. VSS. IV patent to left forearm. Patient falls back asleep.
Patient sleeping quietly. Arouses easily to name. Pain level 2 of 10 and tolerable. VSS. IV patent. Left toes pink warm and dry. Patient denies C/O nausea.
you.    For your comfort, please wear simple loose fitting clothing to the hospital.  Please do not bring valuables. Do not wear any make-up or nail polish on your fingers or toes      For your safety, please do not wear any jewelry or body piercing's on the day of surgery. All jewelry must be removed. If you have dentures, they will be removed before going to operating room. For your convenience, we will provide you with a container. If you wear contact lenses or glasses, they will be removed, please bring a case for them. If you have a living will and a durable power of  for healthcare, please bring in a copy. As part of our patient safety program to minimize surgical site infections, we ask you to do the following:    · Please notify your surgeon if you develop any illness between         now and the  day of your surgery. · This includes a cough, cold, fever, sore throat, nausea,         or vomiting, and diarrhea, etc.  ·  Please notify your surgeon if you experience dizziness, shortness         of breath or blurred vision between now and the time of your surgery. Do not shave your operative site 96 hours prior to surgery. For face and neck surgery, men may use an electric razor 48 hours   prior to surgery. You may shower the night before surgery or the morning of   your surgery with an antibacterial soap. You will need to bring a photo ID and insurance card    [facility] has an onsite pharmacy, would you like to utilize our pharmacy     If you will be staying overnight and use a C-pap machine, please bring   your C-pap to hospital     Our goal is to provide you with excellent care, therefore, visitors will be limited to two(2) in the room at a time so that we may focus on providing this care for you. Please contact pre-admission testing if you have any further questions.                  [facility] phone number:  [phone] PAT fax number:

## 2019-04-22 NOTE — ANESTHESIA PRE PROCEDURE
[facility] Department of Anesthesiology  Pre-Anesthesia Evaluation/Consultation       Name:  [facility]  : 1940  Age:  70 y.o. MRN:  [id number]  Date: 2019           Surgeon: Surgeon(s):  Lenka Weaver MD    Procedure: Procedure(s):  LEFT FOOT BUNION CORRECTION, DISTAL CHEVRON AND DISTAL SOFT TISSUE RELEASE, LEFT FOOT SECOND HAMMERTOE CORRECTION WITH SMART TOE, POSSIBLE WEIL OSTEOTOMY, ANGULAR DEFORMITY CORRECTION WITH MINI C-ARM     No Known Allergies  Patient Active Problem List   Diagnosis    Hyperlipidemia    Pain in joint, shoulder region    Back pain    Hammertoe of second toe of left foot    Bunion of left foot     Past Medical History:   Diagnosis Date    ACL tear     not repaired-left leg    Back pain     Osteoarthritis      Past Surgical History:   Procedure Laterality Date    BREAST SURGERY Left 1996    Biopsy    CARPAL TUNNEL RELEASE Right 2019    CERVICAL SPINE SURGERY      Anterior cervical Discectomy    CERVICAL SPINE SURGERY  2018    Isai-anterior cervical discectomy C3-4 with bone graft    COLONOSCOPY  14    Kindel- sigmoid diverticula, due in     DILATION [address]      x 5    LUMBAR DISC SURGERY      Isai    LUMBAR SPINE SURGERY  2015    Isai     Social History     Tobacco Use    Smoking status: Never Smoker    Smokeless tobacco: Never Used   Substance Use Topics    Alcohol use: No    Drug use: No     Medications  No current facility-administered medications on file prior to encounter. Current Outpatient Medications on File Prior to Encounter   Medication Sig Dispense Refill    acetaminophen (TYLENOL) 500 MG tablet Take 500 mg by mouth as needed      diclofenac (VOLTAREN) 75 MG EC tablet Take 1 tablet by mouth 2 times daily 180 tablet 3    Calcium Carbonate-Vitamin D 600-400 MG-UNIT TABS Take  by mouth.  One po bid        Current Facility-Administered 03/26/2019    GLUCOSE 93 03/26/2019    PROT 6.8 07/11/2018    PROT 7.2 08/05/2011    CALCIUM 10.2 03/26/2019    BILITOT 0.4 07/11/2018    ALKPHOS 76 07/11/2018    AST 17 07/11/2018    ALT 16 07/11/2018     BMP    Lab Results   Component Value Date     03/26/2019    K 4.5 03/26/2019     03/26/2019    CO2 25 03/26/2019    BUN 16 03/26/2019    CREATININE 0.9 03/26/2019    CALCIUM 10.2 03/26/2019    GFRAA >60 03/26/2019    GFRAA >60 08/05/2011    LABGLOM >60 03/26/2019    GLUCOSE 93 03/26/2019     POCGlucose  No results for input(s): GLUCOSE in the last 72 hours. Coags    Lab Results   Component Value Date    PROTIME 10.2 08/05/2011    INR 0.94 08/05/2011    APTT 29.4 73/83/9457     HCG (If Applicable) No results found for: PREGTESTUR, PREGSERUM, HCG, HCGQUANT   ABGs No results found for: PHART, PO2ART, RUK5SWR, QFI8FSD, BEART, U5AXTZUY   Type & Screen (If Applicable)  No results found for: LABABO, LABRH                         BMI: Wt Readings from Last 3 Encounters:       NPO Status:   Date of last liquid consumption: 04/21/19   Time of last liquid consumption: 2100   Date of last solid food consumption: 04/21/19      Time of last solid consumption: 1900       Anesthesia Evaluation  Patient summary reviewed no history of anesthetic complications:   Airway: Mallampati: II  TM distance: >3 FB   Neck ROM: full   Dental:    (+) partials      Pulmonary:Negative Pulmonary ROS and normal exam                               Cardiovascular:Negative CV ROS  Exercise tolerance: good (>4 METS),           Rhythm: regular             Beta Blocker:  Not on Beta Blocker         Neuro/Psych:   Negative Neuro/Psych ROS              GI/Hepatic/Renal: Neg GI/Hepatic/Renal ROS            Endo/Other: Negative Endo/Other ROS                    Abdominal:           Vascular: negative vascular ROS. Anesthesia Plan      general     ASA 2       Induction: intravenous.     MIPS: Postoperative opioids intended and Prophylactic antiemetics administered. Anesthetic plan and risks discussed with patient. Plan discussed with CRNA. This pre-anesthesia assessment may be used as a history and physical.    DOS STAFF ADDENDUM:    Pt seen and examined, chart reviewed (including anesthesia, drug and allergy history). No interval changes to history and physical examination. Anesthetic plan, risks, benefits, alternatives, and personnel involved discussed with patient. Patient verbalized an understanding and agrees to proceed.       Jeremy Francois MD  April 22, 2019  6:42 AM

## 2019-04-22 NOTE — ANESTHESIA POSTPROCEDURE EVALUATION
[facility] Department of Anesthesiology  Post-Anesthesia Note       Name:  Jayne Loja                                  Age:  70 y.o.   MRN:  [id number]     Last Vitals & Oxygen Saturation: /71   Pulse 65   Temp 97.7 °F (36.5 °C) (Temporal)   Resp 13   Ht 5' (1.524 m)   Wt 114 lb 11.2 oz (52 kg)   SpO2 98%   BMI 22.40 kg/m²   Patient Vitals for the past 4 hrs:   BP Temp Temp src Pulse Resp SpO2   04/22/19 1042 -- -- -- -- -- 98 %   04/22/19 1041 -- -- -- 65 13 99 %   04/22/19 1040 -- -- -- 59 15 100 %   04/22/19 1039 -- -- -- 70 19 99 %   04/22/19 1038 -- -- -- 54 11 98 %   04/22/19 1037 120/71 -- -- 54 11 98 %   04/22/19 1036 -- -- -- 55 12 98 %   04/22/19 1035 -- -- -- 54 12 98 %   04/22/19 1034 -- -- -- 54 10 98 %   04/22/19 1033 -- -- -- 57 13 98 %   04/22/19 1032 110/69 -- -- 53 (!) 6 98 %   04/22/19 1030 -- -- -- 53 11 98 %   04/22/19 1029 -- -- -- 52 11 98 %   04/22/19 1028 -- -- -- 53 11 99 %   04/22/19 1027 -- -- -- 53 11 98 %   04/22/19 1026 120/69 -- -- 54 10 99 %   04/22/19 1025 -- -- -- 54 11 99 %   04/22/19 1024 -- -- -- 52 12 98 %   04/22/19 1023 -- -- -- 56 14 97 %   04/22/19 1022 117/70 -- -- 54 10 97 %   04/22/19 1021 -- -- -- 55 11 99 %   04/22/19 1020 122/73 97.7 °F (36.5 °C) Temporal (!) 49 10 98 %   04/22/19 1019 -- -- -- 59 12 98 %   04/22/19 1018 -- -- -- 58 13 99 %   04/22/19 1017 -- -- -- 60 11 100 %   04/22/19 1016 117/74 -- -- 65 10 99 %   04/22/19 1015 -- -- -- 60 16 100 %   04/22/19 1014 -- -- -- 56 12 96 %   04/22/19 1013 -- -- -- 55 11 97 %   04/22/19 1012 -- -- -- 55 9 96 %   04/22/19 1011 -- -- -- 57 12 95 %   04/22/19 1010 -- -- -- 55 11 96 %   04/22/19 1009 -- -- -- 56 12 97 %   04/22/19 1008 -- -- -- 58 13 95 %   04/22/19 1007 -- -- -- 54 8 95 %   04/22/19 1006 -- -- -- 53 13 95 %   04/22/19 1005 -- -- -- 55 10 95 %   04/22/19 1004 -- -- -- 55 15 95 %   04/22/19 1003 -- -- -- 58 10 95 %   04/22/19 1002 106/70 -- -- 59 11 93 %   04/22/19 1001 -- -- -- 54 11 93 % 04/22/19 1000 -- -- -- 58 9 96 %   04/22/19 0959 -- -- -- 54 8 93 %   04/22/19 0958 -- -- -- 58 9 94 %   04/22/19 0957 112/70 -- -- 56 10 97 %   04/22/19 0956 -- -- -- 58 14 97 %   04/22/19 0955 -- -- -- 61 15 96 %   04/22/19 0954 -- -- -- 61 9 97 %   04/22/19 0953 -- -- -- 63 14 98 %   04/22/19 0952 117/72 -- -- 69 (!) 7 98 %   04/22/19 0951 -- -- -- 61 12 98 %   04/22/19 0950 -- -- -- 64 13 98 %   04/22/19 0949 -- -- -- 72 12 98 %   04/22/19 0948 -- -- -- 61 11 97 %   04/22/19 0947 115/71 -- -- 62 9 98 %   04/22/19 0946 -- -- -- 59 10 97 %   04/22/19 0945 -- -- -- 60 9 97 %   04/22/19 0944 -- -- -- 62 12 97 %   04/22/19 0943 -- -- -- 58 10 97 %   04/22/19 0942 125/80 -- -- 63 13 97 %   04/22/19 0941 -- -- -- 68 9 97 %   04/22/19 0940 -- -- -- 69 9 98 %   04/22/19 0939 -- -- -- 68 11 98 %   04/22/19 0938 -- -- -- 74 11 --   04/22/19 0937 -- -- -- 78 13 98 %   04/22/19 0936 129/77 -- -- 70 9 97 %   04/22/19 0934 129/77 97.2 °F (36.2 °C) Temporal 72 9 97 %       Level of consciousness:  Awake, alert to baseline    Respiratory: Respirations easy, no distress. Stable. Cardiovascular: Hemodynamically stable. Hydration: Adequate. PONV: Adequately managed. Post-op pain: Adequately controlled. Post-op assessment: Tolerated anesthetic well without complication. Complications:  None.     Libby James MD  April 22, 2019   10:51 AM

## 2019-04-23 ENCOUNTER — TELEPHONE (OUTPATIENT)
Dept: ORTHOPEDIC SURGERY | Age: 79
End: 2019-04-23

## 2019-04-23 NOTE — TELEPHONE ENCOUNTER
Called and spoke to Lan Rodriguez. Discussed with the patient in length for 23 minutes answering all her post operative questions.

## 2019-04-24 NOTE — OP NOTE
[address] [place] [address]                                OPERATIVE REPORT    PATIENT NAME: Roopa Coyne                     :        1940  MED REC NO:   [id number]                          ROOM:  ACCOUNT NO:   [de-identified]                           ADMIT DATE: 2019  PROVIDER:     Pradeep Worthy MD      DATE OF PROCEDURE:  2019    PRIMARY CARE PHYSICIAN:  López Chavez MD    PREOPERATIVE DIAGNOSES:  1. Left foot great toe bunion deformity. 2.  Left foot second hammertoe rigid deformity. 3.  Crossover left second toe. POSTOPERATIVE DIAGNOSES:  1. Left foot great toe bunion deformity. 2.  Left foot second hammertoe rigid deformity. 3.  Crossover left second toe. OPERATION PERFORMED:  1. Left foot bunion correction with modified Perez distal soft tissue  release. 2.  Left foot great toe bunion correction with distal Chevron biplanar  osteotomy. 3.  Left foot second hammertoe correction with a PIP arthroplasty using  a Smart Toe.  4.  Left foot second toe angular deformity reconstruction with soft  tissue procedure. SURGEON:  Pradeep Worthy MD    ASSISTANT:  Eron Paz CNP    ANESTHESIA:  General anesthesia. ESTIMATED BLOOD LOSS:  Minimal.    COMPLICATIONS:  None. TOURNIQUET:  Left upper calf, 250 mmHg. IMPLANTS USED:  1.  Kel Instratek 3.0 headless compression screw for the Chevron  osteotomy. 2.  Striker titanium Smart Toe size 19-mm straight for the second  hammertoe PIP arthroplasty. INDICATIONS:  This is a 79-year-old white female with left forefoot pain  with deformity. She was diagnosed with a bunion deformity as well as  crossover and fixed hammertoe deformity. All risks, benefits, and  alternatives were discussed with the patient, and she agreed to proceed  with surgical treatment. OPERATIVE PROCEDURE:  The patient's left foot was marked.   She received  2 gm second  metatarsophalangeal joint using 2-0 Ti-Cron. We achieved good  reconstruction of the soft tissue for the angular deformity. At this point, we turned attention towards the second hammertoe  deformity. Incision was made over the PIP joint of the second toe. We  exposed the joint and we used the saw to cut off part of the proximal  phalanx head and middle phalanx base. We then drilled to access the  medullary canal.  We prepared both sides of the joint for the  implantation of the Smart Toe and we elected to use a 19-mm straight  implant, which was placed and we reduced the joint back in appropriate  position with good position of the PIP. C-arm confirmed the good  position of the second toe as well as bunion correction. We then irrigated the incision copiously with normal saline. We  performed partial capsulectomy and we repaired the capsule for the  bunion correction with 2-0 Vicryl. We let the tourniquet down and  hemostasis was secured. We then closed the superficial layer with 3-0  Vicryl and skin with 4-0 Monocryl. Steri-Strips were then applied. Dressing was then applied in the form of Xeroform, 4x4, sterile Webril,  Ace wrap and a postop shoe. The patient tolerated the procedure well and was taken to the recovery  in stable condition. Eron Paz CNP was 1st Assist given the nature of the procedure that needed advanced assistance. POSTOPERATIVE PLAN:  The patient will be discharged home. She can start  partial weightbearing on the heel using a postop shoe for six weeks. She also needs braces to keep the second toe down postoperatively as  well with the Budin splint. Arielle Ga MD    D: 04/23/2019 13:52:01       T: 04/23/2019 18:35:11     [email or web address]  Job#: [id number]     Doc#: [id number]    CC:   López Chavez MD

## 2019-05-08 ENCOUNTER — OFFICE VISIT (OUTPATIENT)
Dept: ORTHOPEDIC SURGERY | Age: 79
End: 2019-05-08
Payer: MEDICARE

## 2019-05-08 VITALS — HEIGHT: 61 IN | RESPIRATION RATE: 16 BRPM | WEIGHT: 118 LBS | BODY MASS INDEX: 22.28 KG/M2

## 2019-05-08 DIAGNOSIS — M20.42 HAMMERTOE OF SECOND TOE OF LEFT FOOT: ICD-10-CM

## 2019-05-08 DIAGNOSIS — M79.675 PAIN OF TOE OF LEFT FOOT: Primary | ICD-10-CM

## 2019-05-08 DIAGNOSIS — M21.612 BUNION OF LEFT FOOT: ICD-10-CM

## 2019-05-08 PROCEDURE — MISC245 PEDIFLEX GEL BUNION SPLINT: Performed by: ORTHOPAEDIC SURGERY

## 2019-05-08 PROCEDURE — 99024 POSTOP FOLLOW-UP VISIT: CPT | Performed by: NURSE PRACTITIONER

## 2019-05-08 PROCEDURE — APPNB30 APP NON BILLABLE TIME 0-30 MINS: Performed by: NURSE PRACTITIONER

## 2019-05-08 PROCEDURE — L3260 AMBULATORY SURGICAL BOOT EAC: HCPCS | Performed by: ORTHOPAEDIC SURGERY

## 2019-05-08 NOTE — PROGRESS NOTES
DIAGNOSIS:    1-Left bunion, status post distal Chevron osteotomy, and modified Perez distal soft tissue release. 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 DATE OF SURGERY:  4/22/2019. HISTORY OF PRESENT ILLNESS:    Ms. José Adhikari 70 y.o. female 2 weeks out from her surgery. She has been partial weightbearing on the heel in a post-op shoe. Rates pain a 2/10 VAS mild, aching, intermittent and are improving. Aggravating factors walking. Alleviating factors elevation and rest. She denies any sharp pain. No fever or chills. No numbness or tingling sensation. PHYSICAL EXAMINATION:    The incision is healing nicely. No signs of any erythema or drainage, with minimal to no swelling. No pain with big toe ROM. She has intact sensation in the left foot. IMAGING:  X-rays were taken in the office today, 3 views of the left foot, and showed the Chevron osteotomy in good position, and a screw in place within the 1st MT head and smart toe, arthroplasty 2nd toe. IMPRESSION:  2 weeks out from:  1-Left bunion, status post distal Chevron osteotomy, and modified Perez distal soft tissue release. 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 PLAN:  The big toe and hammertoe was strapped today in the office. She still continues to be partial weightbearing on the heel for 6 weeks. She will come back in 6 weeks. At that time, we will get new xray. Procedures    Pediflex Gel Bunion Splint $15     Patient was supplied a Pediflex Gel Bunion Splint. This retail item was supplied to provide functional support of the affected area. Verbal and written instructions for the use of and application of this item were provided. The patient was educated and fit by a healthcare professional with expert knowledge and specialization in brace application.   They were instructed to contact the office immediately should the equipment   result in increased pain, decreased sensation, increased swelling or worsening of the condition.  Darco Post-op Shoe Brace     Patient was prescribed a Darco Post-Op Shoe. The left foot will require stabilization / immobilization from this semi-rigid / rigid orthosis to improve their function. The orthosis will assist in protecting the affected area, provide functional support and facilitate healing. Patient was instructed to progress ambulation  as partial weight bearing in the device. The patient was educated and fit by a healthcare professional with expert knowledge and specialization in brace application while under the direct supervision of the treating physician. Verbal and written instructions for the use of and application of this item were provided. They were instructed to contact the office immediately should the brace result in increased pain, decreased sensation, increased swelling or worsening of the condition.      Chase Powell, APRN - CNP

## 2019-06-19 ENCOUNTER — OFFICE VISIT (OUTPATIENT)
Dept: ORTHOPEDIC SURGERY | Age: 79
End: 2019-06-19

## 2019-06-19 VITALS — HEART RATE: 64 BPM | WEIGHT: 118 LBS | HEIGHT: 61 IN | BODY MASS INDEX: 22.28 KG/M2

## 2019-06-19 DIAGNOSIS — M21.612 BUNION OF LEFT FOOT: ICD-10-CM

## 2019-06-19 DIAGNOSIS — M20.42 HAMMERTOE OF SECOND TOE OF LEFT FOOT: Primary | ICD-10-CM

## 2019-06-19 PROCEDURE — 99024 POSTOP FOLLOW-UP VISIT: CPT | Performed by: ORTHOPAEDIC SURGERY

## 2019-07-18 ENCOUNTER — HOSPITAL ENCOUNTER (OUTPATIENT)
Dept: GENERAL RADIOLOGY | Age: 79
Discharge: HOME | End: 2019-07-18
Payer: MEDICARE

## 2019-07-18 DIAGNOSIS — J20.9 BRONCHITIS WITH BRONCHOSPASM: ICD-10-CM

## 2019-07-18 PROCEDURE — 71046 X-RAY EXAM CHEST 2 VIEWS: CPT

## 2019-07-31 ENCOUNTER — OFFICE VISIT (OUTPATIENT)
Dept: ORTHOPEDIC SURGERY | Age: 79
End: 2019-07-31
Payer: MEDICARE

## 2019-07-31 VITALS
HEART RATE: 81 BPM | SYSTOLIC BLOOD PRESSURE: 119 MMHG | RESPIRATION RATE: 16 BRPM | DIASTOLIC BLOOD PRESSURE: 69 MMHG | BODY MASS INDEX: 21.71 KG/M2 | WEIGHT: 115 LBS | HEIGHT: 61 IN

## 2019-07-31 DIAGNOSIS — M21.612 BUNION OF LEFT FOOT: ICD-10-CM

## 2019-07-31 DIAGNOSIS — M20.42 HAMMERTOE OF SECOND TOE OF LEFT FOOT: Primary | ICD-10-CM

## 2019-07-31 PROCEDURE — 4040F PNEUMOC VAC/ADMIN/RCVD: CPT | Performed by: ORTHOPAEDIC SURGERY

## 2019-07-31 PROCEDURE — 99213 OFFICE O/P EST LOW 20 MIN: CPT | Performed by: ORTHOPAEDIC SURGERY

## 2019-07-31 PROCEDURE — G8427 DOCREV CUR MEDS BY ELIG CLIN: HCPCS | Performed by: ORTHOPAEDIC SURGERY

## 2019-07-31 PROCEDURE — 1090F PRES/ABSN URINE INCON ASSESS: CPT | Performed by: ORTHOPAEDIC SURGERY

## 2019-07-31 PROCEDURE — G8420 CALC BMI NORM PARAMETERS: HCPCS | Performed by: ORTHOPAEDIC SURGERY

## 2019-07-31 PROCEDURE — G8399 PT W/DXA RESULTS DOCUMENT: HCPCS | Performed by: ORTHOPAEDIC SURGERY

## 2019-07-31 PROCEDURE — 1123F ACP DISCUSS/DSCN MKR DOCD: CPT | Performed by: ORTHOPAEDIC SURGERY

## 2019-07-31 PROCEDURE — 1036F TOBACCO NON-USER: CPT | Performed by: ORTHOPAEDIC SURGERY

## 2019-07-31 NOTE — PROGRESS NOTES
Pertinent items are noted in HPI  Review of systems reviewed from Patient History Form dated on 3/27/2019 and available in the patient's chart under the Media tab. No change noted. PHYSICAL EXAMINATION:  Ms. Aaron Hou is a very pleasant 70 y.o.  female who presents today in no acute distress, awake, alert, and oriented. She is well dressed, nourished and  groomed. Patient with normal affect. Height is  5' 1\" (1.549 m), weight is 115 lb (52.2 kg), Body mass index is 21.73 kg/m². Resting respiratory rate is 16. The patient walks with no limp. The incision is healing nicely. No signs of any erythema or drainage, with minimal to no swelling. No pain with big toe ROM. She has intact sensation in the left foot. Ankle reflex 1+ bilaterally. Good strength, and no instability both upper and lower extremities. IMAGING:  X-rays were taken in the office today, 3 views of the left foot, and showed the Chevron osteotomy in good position, and a screw in place within the 1st MT head and smart toe, arthroplasty 2nd toe. IMPRESSION:     1-Left bunion, status post distal Chevron osteotomy, and modified Perez distal soft tissue release. 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 PLAN:  She can go back to normal activity with no restrictions. She will be seen PRN. I told the patient that it is not unusual to have some achy pain and swelling for up to a year after her surgery.       Jake Morales MD

## 2021-01-15 ENCOUNTER — OFFICE VISIT (OUTPATIENT)
Dept: PRIMARY CARE CLINIC | Age: 81
End: 2021-01-15
Payer: MEDICARE

## 2021-01-15 DIAGNOSIS — Z20.822 EXPOSURE TO COVID-19 VIRUS: Primary | ICD-10-CM

## 2021-01-15 PROCEDURE — 99211 OFF/OP EST MAY X REQ PHY/QHP: CPT | Performed by: NURSE PRACTITIONER

## 2021-01-15 PROCEDURE — G8420 CALC BMI NORM PARAMETERS: HCPCS | Performed by: NURSE PRACTITIONER

## 2021-01-15 PROCEDURE — G8428 CUR MEDS NOT DOCUMENT: HCPCS | Performed by: NURSE PRACTITIONER

## 2021-01-15 NOTE — PROGRESS NOTES
Pozo Germaine received a viral test for COVID-19. They were educated on isolation and quarantine as appropriate. For any symptoms, they were directed to seek care from their PCP, given contact information to establish with a doctor, directed to an urgent care or the emergency room.

## 2021-01-16 LAB — SARS-COV-2, NAA: NOT DETECTED

## 2021-01-27 ENCOUNTER — IMMUNIZATION (OUTPATIENT)
Dept: PRIMARY CARE CLINIC | Age: 81
End: 2021-01-27
Payer: MEDICARE

## 2021-01-27 PROCEDURE — 0001A PR IMM ADMN SARSCOV2 30MCG/0.3ML DIL RECON 1ST DOSE: CPT | Performed by: FAMILY MEDICINE

## 2021-01-27 PROCEDURE — 91300 COVID-19, PFIZER VACCINE 30MCG/0.3ML DOSE: CPT | Performed by: FAMILY MEDICINE

## 2021-02-17 ENCOUNTER — IMMUNIZATION (OUTPATIENT)
Dept: PRIMARY CARE CLINIC | Age: 81
End: 2021-02-17
Payer: MEDICARE

## 2021-02-17 PROCEDURE — 0002A COVID-19, PFIZER VACCINE 30MCG/0.3ML DOSE: CPT | Performed by: FAMILY MEDICINE

## 2021-02-17 PROCEDURE — 91300 COVID-19, PFIZER VACCINE 30MCG/0.3ML DOSE: CPT | Performed by: FAMILY MEDICINE

## 2021-05-24 DIAGNOSIS — E78.00 PURE HYPERCHOLESTEROLEMIA: ICD-10-CM

## 2021-05-24 DIAGNOSIS — Z11.59 ENCOUNTER FOR HEPATITIS C SCREENING TEST FOR LOW RISK PATIENT: ICD-10-CM

## 2021-05-24 DIAGNOSIS — Z00.00 ROUTINE GENERAL MEDICAL EXAMINATION AT A HEALTH CARE FACILITY: ICD-10-CM

## 2021-05-24 DIAGNOSIS — Z79.1 NSAID LONG-TERM USE: ICD-10-CM

## 2021-05-24 PROBLEM — M21.612 BUNION OF LEFT FOOT: Status: RESOLVED | Noted: 2019-04-02 | Resolved: 2021-05-24

## 2021-05-24 PROBLEM — M20.42 HAMMERTOE OF SECOND TOE OF LEFT FOOT: Status: RESOLVED | Noted: 2019-04-02 | Resolved: 2021-05-24

## 2021-05-24 LAB
A/G RATIO: 2.4 (ref 1.1–2.2)
ALBUMIN SERPL-MCNC: 5 G/DL (ref 3.4–5)
ALP BLD-CCNC: 70 U/L (ref 40–129)
ALT SERPL-CCNC: 26 U/L (ref 10–40)
ANION GAP SERPL CALCULATED.3IONS-SCNC: 11 MMOL/L (ref 3–16)
AST SERPL-CCNC: 25 U/L (ref 15–37)
BASOPHILS ABSOLUTE: 0.1 K/UL (ref 0–0.2)
BASOPHILS RELATIVE PERCENT: 1 %
BILIRUB SERPL-MCNC: 0.5 MG/DL (ref 0–1)
BUN BLDV-MCNC: 29 MG/DL (ref 7–20)
CALCIUM SERPL-MCNC: 10.6 MG/DL (ref 8.3–10.6)
CHLORIDE BLD-SCNC: 105 MMOL/L (ref 99–110)
CHOLESTEROL, TOTAL: 208 MG/DL (ref 0–199)
CO2: 28 MMOL/L (ref 21–32)
CREAT SERPL-MCNC: 0.9 MG/DL (ref 0.6–1.2)
EOSINOPHILS ABSOLUTE: 0.1 K/UL (ref 0–0.6)
EOSINOPHILS RELATIVE PERCENT: 1.3 %
GFR AFRICAN AMERICAN: >60
GFR NON-AFRICAN AMERICAN: >60
GLOBULIN: 2.1 G/DL
GLUCOSE BLD-MCNC: 97 MG/DL (ref 70–99)
HCT VFR BLD CALC: 38.7 % (ref 36–48)
HDLC SERPL-MCNC: 58 MG/DL (ref 40–60)
HEMOGLOBIN: 13.1 G/DL (ref 12–16)
LDL CHOLESTEROL CALCULATED: 133 MG/DL
LYMPHOCYTES ABSOLUTE: 1.6 K/UL (ref 1–5.1)
LYMPHOCYTES RELATIVE PERCENT: 29.8 %
MCH RBC QN AUTO: 31.1 PG (ref 26–34)
MCHC RBC AUTO-ENTMCNC: 33.9 G/DL (ref 31–36)
MCV RBC AUTO: 91.7 FL (ref 80–100)
MONOCYTES ABSOLUTE: 0.3 K/UL (ref 0–1.3)
MONOCYTES RELATIVE PERCENT: 4.8 %
NEUTROPHILS ABSOLUTE: 3.3 K/UL (ref 1.7–7.7)
NEUTROPHILS RELATIVE PERCENT: 63.1 %
PDW BLD-RTO: 12.3 % (ref 12.4–15.4)
PLATELET # BLD: 222 K/UL (ref 135–450)
PMV BLD AUTO: 8.4 FL (ref 5–10.5)
POTASSIUM SERPL-SCNC: 5.2 MMOL/L (ref 3.5–5.1)
RBC # BLD: 4.23 M/UL (ref 4–5.2)
SODIUM BLD-SCNC: 144 MMOL/L (ref 136–145)
TOTAL PROTEIN: 7.1 G/DL (ref 6.4–8.2)
TRIGL SERPL-MCNC: 86 MG/DL (ref 0–150)
TSH SERPL DL<=0.05 MIU/L-ACNC: 1.14 UIU/ML (ref 0.27–4.2)
VLDLC SERPL CALC-MCNC: 17 MG/DL
WBC # BLD: 5.2 K/UL (ref 4–11)

## 2021-05-25 LAB — HEPATITIS C ANTIBODY INTERPRETATION: NORMAL

## 2021-06-09 ENCOUNTER — HOSPITAL ENCOUNTER (OUTPATIENT)
Dept: PHYSICAL THERAPY | Age: 81
Setting detail: THERAPIES SERIES
Discharge: HOME | End: 2021-06-09
Payer: MEDICARE

## 2021-06-09 PROCEDURE — 97110 THERAPEUTIC EXERCISES: CPT

## 2021-06-09 PROCEDURE — 97161 PT EVAL LOW COMPLEX 20 MIN: CPT

## 2021-06-09 PROCEDURE — 97140 MANUAL THERAPY 1/> REGIONS: CPT

## 2021-06-09 PROCEDURE — 97162 PT EVAL MOD COMPLEX 30 MIN: CPT

## 2021-06-09 NOTE — PLAN OF CARE
[facility] [address]  Phone [phone]   Fax [phone]                                                       Physical Therapy Certification    Dear Referring Practitioner: Dr. Mack Holder,    We had the pleasure of evaluating the following patient for physical therapy services at [address] [place] [address]. A summary of our findings can be found in the initial assessment below. This includes our plan of care. If you have any questions or concerns regarding these findings, please do not hesitate to contact me at the office phone number checked above.   Thank you for the referral.       Physician Signature:_______________________________Date:__________________  By signing above (or electronic signature), therapists plan is approved by physician      Patient: Kareem Cai   : 1940   MRN: [id number]  Referring Physician: Referring Practitioner: Dr. Mack Holder      Evaluation Date: 2021      Medical Diagnosis Information:  Diagnosis: Chronic neck pain (M65.2, G89.29)   Treatment Diagnosis: Chronic neck pain                                         Insurance information: PT Insurance Information: [employer] - $147 deductible (met), $147 OOP max (met), $0 co-pay, 100% co-insurance, PT visits based on [place] - requires auth through cohere    Precautions/ Contra-indications: Hx of 2 previous neck surgeries (C3-4 ACDF  & pt cannot recall what exactly was done in  but believes it was something on C6-7 - possibly discectomy?)    C-SSRS Triggered by Intake questionnaire (Past 2 wk assessment):   [x] No, Questionnaire did not trigger screening.   [] Yes, Patient intake triggered further evaluation      [] C-SSRS Screening completed  [] PCP notified via Plan of Care  [] Emergency services notified     Latex Allergy:  [x]NO      []YES  Preferred Language for Healthcare:   [x]English       []Other:      SUBJECTIVE: Patient stated complaint: Pt reports that she has long hx of neck pain. Pt had sx in 2005 - difficulty recalling what was done, but believes it was something with C6-7. Pt had significant disc herniation, and then had C3-4 ACDF in March 2018. About 1-1.5 years ago, pt states that she started having a lot of stress with the start of the pandemic and a  with medical co-morbidities. Pt states that her neck (L>R) pain started to worsen and has not improved any. Currently, pt states that her pain is along L lateral aspect of neck and then radiates up into the left side of her head. Pt states that she has headaches daily. Pain is worst with turning head in either direction (L rotation is significantly more painful though), walking (the movement of her neck that occurs with walking) and reaching overhead. Pain worsens as the day goes on. Pt denies any N/T or radicular sxs into B UE. Pt states that she does occasionally wear a cervical collar while sleeping, which seems to help. Pt is R handed. MD told pt that she was a lot of arthritis in her neck. 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     OBJECTIVE:     CERV ROM      Cervical Flexion 20 *pn    Cervical Extension 25 *pn     Left Right   Cervical SB 10 *pn 10 *mild pn   Cervical rotation 17 *pn 32 *mild pn   Quadrant     Dorsal Glide      UE ROM  Left Right   Shoulder Flex 100 100   Shoulder Abd 100 100   Shoulder ER 50 50   Shoulder IR 50 50   Elbow flex/ext     Wrist flex/ext/pro/sup     Finger flex/ext/opposition     Shoulder AROM WNL w OP     UE Strength  Left Right   Shoulder Flex 4- 4   Shoulder Scap     Shoulder ABD (C5) 4- 4   Shoulder ER  4- 4   Shoulder IR 4- 4   Elbow Flex (C5) 4 4+   Elbow Ext (C7 Radial) 4 4+   Wrist Flex (C6 Radial) 4 4+   Wrist Ext (C7 Radial) 4 4+   Finger flex (C8 median) 4 4+   Finger ext (C7 Radial-PIN) 4 4+   APB (T1 Median) 4 4+   Finger Abd (T1 Ulnar) 4- 4   UE myotomes WNL        Reflexes Normal Abnormal Comments   S1-2 Seated achilles [] []    S1-2 Prone knee bend [] []    L3-4 Patellar tendon [] []    C5-6 Biceps [] []    C6 Brachioradialis [] []    C7-8 Triceps [] []      Joint mobility: C5-7 PA mobs   []Normal    [x]Hypo   []Hyper    Palpation: TTP L cervical paraspinals, L suboccipitals, L UT, L levator scapulae    Functional Mobility/Transfers: Pt has difficulty with functional bed mobility and functional reaching due to neck pain    Posture: Rounded shoulders, forward head, increased thoracic kyphosis    Bandages/Dressings/Incisions: N/A    Gait: (include devices/WB status):  WNL    Orthopedic Special Tests:     Screening Testing  Normal Abnormal N/A Comments   Babinski Test [] [] []    Vides Test [] [] []    Inverted Sup Sign [] [] []    Alar Ligament Test (spinous kick) [x] [] []    Transverse Ligament Test [] [] []    Sharp-Julia Test [x] [] []    Hautards Test [] [] []    Vertebral Artery Test [] [] []             Orthopedic Special Tests Normal Abnormal N/A Comments   Spurling Maneuver: * [] [] []    Distraction testing:* [] [] []    ULTT* [] [] []    Rotation < 60 deg involved side* [] [x] []    Shoulder Abd test [] [] []    Cerv Rot/Lat Flex- 1st Rib [] [] []    Deep Neck Flex/endurance testing [] [] []    Craniocerv Flex testing [place] [] [] []    End Range Tolerance testing. [] [] []     [] [] []    *clinical cluster for cervical radiculopathy          [x] Patient history, allergies, meds reviewed. Medical chart reviewed. See intake form. Review Of Systems (ROS):  [x]Performed Review of systems (Integumentary, CardioPulmonary, Neurological) by intake and observation. Intake form has been scanned into medical record. Patient has been instructed to contact their primary care physician regarding ROS issues if not already being addressed at this time. Co-morbidities/Complexities (which will affect course of rehabilitation):   []None           Arthritic conditions   []Rheumatoid arthritis (M05.9)  [x]Osteoarthritis (M19.91)   Cardiovascular conditions   []Hypertension (I10)  []Hyperlipidemia (E78.5)  []Angina pectoris (I20)  []Atherosclerosis (I70)   Musculoskeletal conditions   []Disc pathology   []Congenital spine pathologies   [x]Prior surgical intervention  []Osteoporosis (M81.8)  []Osteopenia (M85.8)   Endocrine conditions   []Hypothyroid (E03.9)  []Hyperthyroid Gastrointestinal conditions   []Constipation (O92.64)   Metabolic conditions   []Morbid obesity (E66.01)  []Diabetes type 1(E10.65) or 2 (E11.65)   []Neuropathy (G60.9)     Pulmonary conditions   []Asthma (J45)  []Coughing   []COPD (J44.9)   Psychological Disorders  []Anxiety (F41.9)  []Depression (F32.9)   []Other:   []Other:          Barriers to/and or personal factors that will affect rehab potential:              [x]Age  []Sex              []Motivation/Lack of Motivation                        [x]Co-Morbidities              []Cognitive Function, education/learning barriers              [x]Environmental, home barriers              []profession/work barriers  [x]past PT/medical experience  []other:  Justification:     Falls Risk Assessment (30 days):   [x] Falls Risk assessed and no intervention required.   [] Falls Risk assessed and Patient requires intervention due to being higher risk   TUG score (>12s at risk):     [] Falls education provided, including       ASSESSMENT:    Functional Impairments:     [x]Noted cervical/thoracic/GHJ joint hypomobility   []Noted cervical/thoracic/GHJ joint hypermobility   [x]Decreased cervical/UE functional ROM   []Noted Headache pain aggravated by neck movements with/without dizziness   []Abnormal reflexes/sensation/myotomal/dermatomal deficits   [x]Decreased DCF control or ability to hold head up   [x]Decreased RC/scapular/core strength and neuromuscular control    [x]Decreased UE functional strength   []other:      Functional Activity Limitations (from functional questionnaire and intake)   [x]Reduced ability to tolerate prolonged functional positions   [x]Reduced ability or difficulty with changes of positions or transfers between positions   [x]Reduced ability to maintain good posture and demonstrate good body mechanics with sitting, bending, and lifting   [x] Reduced ability or tolerance with driving and/or computer work   [x]Reduced ability to perform lifting, reaching, carrying tasks   []Reduced ability to concentrate   []Reduced ability to sleep    []Reduced ability to tolerate any impact through UE or spine   [x]Reduced ability to ambulate prolonged functional periods/distances   []other:    Participation Restrictions   [x]Reduced participation in self care activities   [x]Reduced participation in home management activities   []Reduced participation in work activities   [x]Reduced participation in social activities. []Reduced participation in sport/recreational activities.     Classification/Subgrouping:   [x]signs/symptoms consistent with neck pain with mobility deficits     []signs/symptoms consistent with neck pain with movement coordinated impairments    []signs/symptoms consistent with neck pain with radiating pain    [x]signs/symptoms consistent with neck pain with headaches (cervicogenic)    []Signs/symptoms consistent with nerve root involvement including myotome & dermatome dysfunction   []sign/symptoms consistent with facet dysfunction of cervical and thoracic spine    []signs/symptoms consistent suggesting central cord compression/UMN syndromes   []signs/symptoms consistent with discogenic cervical pain   []signs/symptoms consistent with rib dysfunction   [x]signs/symptoms consistent with postural dysfunction   []signs/symptoms consistent with shoulder pathology    []signs/symptoms consistent with post-surgical status including decreased ROM, strength and function.    []signs/symptoms consistent with pathology which may benefit from Dry Needling  []signs/symptoms which may limit the use of advanced manual therapy techniques: (Hypertension, recent trauma, intolerance to end range positions, prior TIA, visual issues, UE myotomes loss )     Prognosis/Rehab Potential:      []Excellent   [x]Good    []Fair   []Poor    Tolerance of evaluation/treatment:    []Excellent   [x]Good    []Fair   []Poor    Physical Therapy Evaluation Complexity Justification  [x] A history of present problem with:  [] no personal factors and/or comorbidities that impact the plan of care;  [x]1-2 personal factors and/or comorbidities that impact the plan of care  []3 personal factors and/or comorbidities that impact the plan of care  [x] An examination of body systems using standardized tests and measures addressing any of the following: body structures and functions (impairments), activity limitations, and/or participation restrictions;:  [] a total of 1-2 or more elements   [x] a total of 3 or more elements   [] a total of 4 or more elements   [x] A clinical presentation with:  [] stable and/or uncomplicated characteristics   [x] evolving clinical presentation with changing characteristics  [] unstable and unpredictable characteristics;   [x] Clinical decision making of [] low, [x] moderate, [] high complexity using standardized patient assessment instrument and/or measurable assessment of functional outcome. [] EVAL (LOW) 69142 (typically 30 minutes face-to-face)  [x] EVAL (MOD) 86067 (typically 30 minutes face-to-face)  [] EVAL (HIGH) 53373 (typically 45 minutes face-to-face)  [] RE-EVAL     PLAN:   Frequency/Duration:  1-2 days per week for 4 Weeks:  Interventions:  [x]  Therapeutic exercise including: strength training, ROM, for cervical spine,scapula, core and Upper extremity, including postural re-education. [x]  NMR activation and proprioception for Deep cervical flexors, periscapular and RC muscles and Core, including postural re-education. [x]  Manual therapy as indicated for C/T spine, ribs, Soft tissue to include: Dry Needling/IASTM, STM, PROM, Gr I-IV mobilizations, manipulation. [x] Modalities as needed that may include: thermal agents, E-stim, Biofeedback, US, iontophoresis as indicated  [x] Patient education on joint protection, postural re-education, activity modification, progression of HEP. HEP instruction: (see scanned forms)    GOALS:  Patient stated goal: \"To have more days that are painfree\"  [] Progressing: [] Met: [] Not Met: [] Adjusted    Therapist goals for Patient:   Short Term Goals: To be achieved in: 2 weeks  1. Independent in HEP and progression per patient tolerance, in order to prevent re-injury. [] Progressing: [] Met: [] Not Met: [] Adjusted    2. Patient will have a decrease in pain to facilitate improvement in movement, function, and ADLs as indicated by Functional Deficits. [] Progressing: [] Met: [] Not Met: [] Adjusted    Long Term Goals: To be achieved in: 4 weeks  1. Disability index score of 20% or less for the NDI to assist with reaching prior level of function. [] Progressing: [] Met: [] Not Met: [] Adjusted  2.  Patient will demonstrate increased AROM to [facility] of cervical/thoracic spine to allow for proper joint functioning as indicated by patients Functional Deficits. [] Progressing: [] Met: [] Not Met: [] Adjusted  3. Patient will demonstrate an increase in postural awareness and control and activation of  Deep cervical stabilizers to allow for proper functional mobility as indicated by patients Functional Deficits. [] Progressing: [] Met: [] Not Met: [] Adjusted  4. Patient will return to turning her head in order to drive without increased symptoms or restriction. [] Progressing: [] Met: [] Not Met: [] Adjusted   5. Patient will be able to walk for 30 minutes without increased symptoms or restriction.    [] Progressing: [] Met: [] Not Met: [] Adjusted       Electronically signed by:  David Sylvester, PT

## 2021-06-09 NOTE — FLOWSHEET NOTE
CPA mobs in supine  Gr I-II   STM 10'   L cervical paraspinals, L suboccipitals                 NMR re-education                                                                   Therapeutic Exercise and NMR EXR  [x] (11096) Provided verbal/tactile cueing for activities related to strengthening, flexibility, endurance, ROM  for improvements in scapular, scapulothoracic and UE control with self care, reaching, carrying, lifting, house/yardwork, driving/computer work.    [] (76516) Provided verbal/tactile cueing for activities related to improving balance, coordination, kinesthetic sense, posture, motor skill, proprioception  to assist with  scapular, scapulothoracic and UE control with self care, reaching, carrying, lifting, house/yardwork, driving/computer work. Therapeutic Activities:    [] (24157 or 25407) Provided verbal/tactile cueing for activities related to improving balance, coordination, kinesthetic sense, posture, motor skill, proprioception and motor activation to allow for proper function of scapular, scapulothoracic and UE control with self care, carrying, lifting, driving/computer work.      Home Exercise Program:    [x] (64125) Reviewed/Progressed HEP activities related to strengthening, flexibility, endurance, ROM of scapular, scapulothoracic and UE control with self care, reaching, carrying, lifting, house/yardwork, driving/computer work  [] (78416) Reviewed/Progressed HEP activities related to improving balance, coordination, kinesthetic sense, posture, motor skill, proprioception of scapular, scapulothoracic and UE control with self care, reaching, carrying, lifting, house/yardwork, driving/computer work      Manual Treatments:  PROM / STM / Oscillations-Mobs:  G-I, II, III, IV (PA's, Inf., Post.)  [x] (32117) Provided manual therapy to mobilize soft tissue/joints of cervical/CT, scapular GHJ and UE for the purpose of modulating pain, promoting relaxation,  increasing ROM, reducing/eliminating soft tissue swelling/inflammation/restriction, improving soft tissue extensibility and allowing for proper ROM for normal function with self care, reaching, carrying, lifting, house/yardwork, driving/computer work    Modalities: Discussed use of heat     Charges:  Timed Code Treatment Minutes: 30   Total Treatment Minutes: 50       [x] EVAL (LOW) 00169 (typically 20 minutes face-to-face)  [] EVAL (MOD) 97985 (typically 30 minutes face-to-face)  [] EVAL (HIGH) 84090 (typically 45 minutes face-to-face)  [] RE-EVAL     [x] ZD(02472) x1     [] IONTO (53868)  [] NMR (60816) x     [] VASO (44313)  [x] Manual (11671) x 1    [] Other:  [] TA (65933)x     [] Mech Traction (35964)  [] ES(attended) (99188)     [] ES (un) (26735): If BWC Please Indicate Time In/Out and Total Minutes  CPT Code Time in Time out Total Min                                              GOALS:  Patient stated goal: \"To have more days that are painfree\"  []? Progressing: []? Met: []? Not Met: []? Adjusted     Therapist goals for Patient:   Short Term Goals: To be achieved in: 2 weeks  1. Independent in HEP and progression per patient tolerance, in order to prevent re-injury. []? Progressing: []? Met: []? Not Met: []? Adjusted     2. Patient will have a decrease in pain to facilitate improvement in movement, function, and ADLs as indicated by Functional Deficits. []? Progressing: []? Met: []? Not Met: []? Adjusted     Long Term Goals: To be achieved in: 4 weeks  1. Disability index score of 20% or less for the NDI to assist with reaching prior level of function. []? Progressing: []? Met: []? Not Met: []? Adjusted  2. Patient will demonstrate increased AROM to [facility] of cervical/thoracic spine to allow for proper joint functioning as indicated by patients Functional Deficits. []? Progressing: []? Met: []? Not Met: []? Adjusted  3.  Patient will demonstrate an increase in postural awareness and control and activation of  Deep cervical stabilizers to allow for proper functional mobility as indicated by patients Functional Deficits. []? Progressing: []? Met: []? Not Met: []? Adjusted  4. Patient will return to turning her head in order to drive without increased symptoms or restriction. []? Progressing: []? Met: []? Not Met: []? Adjusted   5. Patient will be able to walk for 30 minutes without increased symptoms or restriction. []? Progressing: []? Met: []? Not Met: []? Adjusted         Progression Towards Functional goals:  [] Patient is progressing as expected towards functional goals listed. [] Progression is slowed due to complexities listed. [] Progression has been slowed due to co-morbidities. [x] Plan just implemented, too soon to assess goals progression  [] Other:     ASSESSMENT:  See negar    Return to Play: (if applicable)   []  Stage 1: Intro to Strength   []  Stage 2: Dynamic Strength and Intro to Plyometrics   []  Stage 3: Advanced Plyometrics and Intro to Throwing   []  Stage 4: Sport specific Training/Return to Sport     []  Ready to Return to Play, [employer]   []  Not Ready for Return to Sports   Comments:      Treatment/Activity Tolerance:  [x] Patient tolerated treatment well [] Patient limited by fatique  [] Patient limited by pain  [] Patient limited by other medical complications  [] Other:     Overall Progression Towards Functional goals/ Treatment Progress Update:  [] Patient is progressing as expected towards functional goals listed. [] Progression is slowed due to complexities/Impairments listed. [] Progression has been slowed due to co-morbidities.   [x] Plan just implemented, too soon to assess goals progression <30days   [] Goals require adjustment due to lack of progress  [] Patient is not progressing as expected and requires additional follow up with physician  [] Other    Prognosis for POC: [] Good [x] Fair  [] Poor    Patient requires continued skilled intervention: [x] Yes  [] No      PLAN: See eval  [] Continue per plan of care [] Alter current plan (see comments)  [x] Plan of care initiated [] Hold pending MD visit [] Discharge    Electronically signed by: Abdon Galindo PT     Note: If patient does not return for scheduled/recommended follow up visits, this note will serve as a discharge from care along with the most recent update on progress.

## 2021-06-16 ENCOUNTER — HOSPITAL ENCOUNTER (OUTPATIENT)
Dept: PHYSICAL THERAPY | Age: 81
Setting detail: THERAPIES SERIES
Discharge: HOME | End: 2021-06-16
Payer: MEDICARE

## 2021-06-16 PROCEDURE — 97140 MANUAL THERAPY 1/> REGIONS: CPT

## 2021-06-16 PROCEDURE — 97110 THERAPEUTIC EXERCISES: CPT

## 2021-06-16 NOTE — FLOWSHEET NOTE
[employer] [facility] [employer]    Physical Therapy Treatment Note/ Progress Report:     Date:  2021    Patient Name:  Josie Ring    :  1940  MRN: [id number]  Medical/Treatment Diagnosis Information:  · Diagnosis: Chronic neck pain (M65.2, G89.29)  · Treatment Diagnosis: Chronic neck pain  Insurance/Certification information:  PT Insurance Information: [employer] Medicare - $147 deductible (met), $147 OOP max (met), $0 co-pay, 100% co-insurance, PT visits based on MN - requires auth through [facility]  Physician Information:  Referring Practitioner: Dr. Alanna Seymour of care signed (Y/N):     Date of Patient follow up with Physician: prn     Progress Report: []  Yes  [x]  No     Functional Scale: NDI = 34%   Date: 21    Date Range for reporting period:  Beginnin21  Ending:      Progress report due (10 Rx/or 30 days whichever is less): 53    Recertification due (POC duration/ or 90 days whichever is less): 21    Visit # Insurance Allowable Auth Needed   2 total  1/ 4 visits approved after eval 6/10 -  [x]Yes    []No     Pain level:  7-8/10 headache     SUBJECTIVE:  Pt reports that neck felt really good for a couple of days after last session. Pt reports HEP compliance, but not as much relief with exercises as what she got from PT session. Pt reports that she has a headache on R side that started about an hour ago.      OBJECTIVE: See eval   Observation:    Test measurements:      RESTRICTIONS/PRECAUTIONS: hx of C3-4 ACDF , hx of neck sx  (pt unsure, but thinks C6-7 and possibly discectomy?)    Exercises/Interventions:   Therapeutic Ex 15' Wt / Resistance Sets/sec Reps Notes          Seated cervical retraction  1 10    Scap retraction  1 10    Upper trap stretch  20\" 3 L only   Levator scap stretch  20\" 3 L only   TB rows red 1 10                                                                             Therapeutic Activities                                                                      Manual Intervention 25'    **Pt requires multiple pillows when lying supine for comfort**   Shld /GH Mobs       Post Cap mobs       Thoracic/Rib manipulation       CT MT/Mobs 10'   C5-7 CPA mobs in supine  Gr I-II   STM 15'   B cervical paraspinals, B suboccipitals                 NMR re-education                                                                   Therapeutic Exercise and NMR EXR  [x] (40065) Provided verbal/tactile cueing for activities related to strengthening, flexibility, endurance, ROM  for improvements in scapular, scapulothoracic and UE control with self care, reaching, carrying, lifting, house/yardwork, driving/computer work.    [] (63168) Provided verbal/tactile cueing for activities related to improving balance, coordination, kinesthetic sense, posture, motor skill, proprioception  to assist with  scapular, scapulothoracic and UE control with self care, reaching, carrying, lifting, house/yardwork, driving/computer work. Therapeutic Activities:    [] (94132 or 54397) Provided verbal/tactile cueing for activities related to improving balance, coordination, kinesthetic sense, posture, motor skill, proprioception and motor activation to allow for proper function of scapular, scapulothoracic and UE control with self care, carrying, lifting, driving/computer work.      Home Exercise Program:    [x] (21520) Reviewed/Progressed HEP activities related to strengthening, flexibility, endurance, ROM of scapular, scapulothoracic and UE control with self care, reaching, carrying, lifting, house/yardwork, driving/computer work  [] (27832) Reviewed/Progressed HEP activities related to improving balance, coordination, kinesthetic sense, posture, motor skill, proprioception of scapular, scapulothoracic and UE control with self care, reaching, carrying, lifting, house/yardwork, driving/computer work      Manual Treatments:  PROM / STM / Oscillations-Mobs:  G-I, II, III, IV (PA's, Inf., Post.)  [x] (71526) Provided manual therapy to mobilize soft tissue/joints of cervical/CT, scapular GHJ and UE for the purpose of modulating pain, promoting relaxation,  increasing ROM, reducing/eliminating soft tissue swelling/inflammation/restriction, improving soft tissue extensibility and allowing for proper ROM for normal function with self care, reaching, carrying, lifting, house/yardwork, driving/computer work    Modalities: Discussed use of heat     Charges:  Timed Code Treatment Minutes: 40   Total Treatment Minutes: 45       [] EVAL (LOW) 67479 (typically 20 minutes face-to-face)  [] EVAL (MOD) 98174 (typically 30 minutes face-to-face)  [] EVAL (HIGH) 94056 (typically 45 minutes face-to-face)  [] RE-EVAL     [x] ZG(57663) x1     [] IONTO (34282)  [] NMR (81518) x     [] VASO (19772)  [x] Manual (58786) x 2    [] Other:  [] TA (25784)x     [] Mech Traction (49270)  [] ES(attended) (18828)     [] ES (un) (62945): If BW Please Indicate Time In/Out and Total Minutes  CPT Code Time in Time out Total Min                                              GOALS:  Patient stated goal: \"To have more days that are painfree\"  []? Progressing: []? Met: []? Not Met: []? Adjusted     Therapist goals for Patient:   Short Term Goals: To be achieved in: 2 weeks  1. Independent in HEP and progression per patient tolerance, in order to prevent re-injury. []? Progressing: []? Met: []? Not Met: []? Adjusted     2. Patient will have a decrease in pain to facilitate improvement in movement, function, and ADLs as indicated by Functional Deficits. []? Progressing: []? Met: []? Not Met: []? Adjusted     Long Term Goals: To be achieved in: 4 weeks  1. Disability index score of 20% or less for the NDI to assist with reaching prior level of function. []? Progressing: []? Met: []? Not Met: []? Adjusted  2.  Patient will demonstrate increased AROM to [facility] of cervical/thoracic spine to allow for proper joint functioning as indicated by patients Functional Deficits. []? Progressing: []? Met: []? Not Met: []? Adjusted  3. Patient will demonstrate an increase in postural awareness and control and activation of  Deep cervical stabilizers to allow for proper functional mobility as indicated by patients Functional Deficits. []? Progressing: []? Met: []? Not Met: []? Adjusted  4. Patient will return to turning her head in order to drive without increased symptoms or restriction. []? Progressing: []? Met: []? Not Met: []? Adjusted   5. Patient will be able to walk for 30 minutes without increased symptoms or restriction. []? Progressing: []? Met: []? Not Met: []? Adjusted         Progression Towards Functional goals:  [] Patient is progressing as expected towards functional goals listed. [] Progression is slowed due to complexities listed. [] Progression has been slowed due to co-morbidities. [x] Plan just implemented, too soon to assess goals progression  [] Other:     ASSESSMENT: Good response to manual therapy today with no headache reported afterwards. TTP and tightness noted along B suboccipitals and cervical paraspinals today. Added L upper trap and levator scapulae stretches for improved flexibility. Also added TB rows for scapular strengthening with verbal and tactile cues required to decrease UT compensation and for appropriate form.      Return to Play: (if applicable)   []  Stage 1: Intro to Strength   []  Stage 2: Dynamic Strength and Intro to Plyometrics   []  Stage 3: Advanced Plyometrics and Intro to Throwing   []  Stage 4: Sport specific Training/Return to Sport     []  Ready to Return to Play, [employer]   []  Not Ready for Return to Sports   Comments:      Treatment/Activity Tolerance:  [x] Patient tolerated treatment well [] Patient limited by fatique  [] Patient limited by pain  [] Patient limited by other medical complications  [] Other:     Overall Progression Towards Functional goals/ Treatment Progress Update:  [] Patient is progressing as expected towards functional goals listed. [] Progression is slowed due to complexities/Impairments listed. [] Progression has been slowed due to co-morbidities. [x] Plan just implemented, too soon to assess goals progression <30days   [] Goals require adjustment due to lack of progress  [] Patient is not progressing as expected and requires additional follow up with physician  [] Other    Prognosis for POC: [] Good [x] Fair  [] Poor    Patient requires continued skilled intervention: [x] Yes  [] No      PLAN: Decrease neck pain and headache. [x] Continue per plan of care [] Alter current plan (see comments)  [] Plan of care initiated [] Hold pending MD visit [] Discharge    Electronically signed by: Joey Teran PT     Note: If patient does not return for scheduled/recommended follow up visits, this note will serve as a discharge from care along with the most recent update on progress.

## 2021-06-21 ENCOUNTER — HOSPITAL ENCOUNTER (OUTPATIENT)
Dept: PHYSICAL THERAPY | Age: 81
Setting detail: THERAPIES SERIES
Discharge: HOME | End: 2021-06-21
Payer: MEDICARE

## 2021-06-21 PROCEDURE — 97110 THERAPEUTIC EXERCISES: CPT

## 2021-06-21 PROCEDURE — 97140 MANUAL THERAPY 1/> REGIONS: CPT

## 2021-06-21 NOTE — FLOWSHEET NOTE
[employer] [facility]    Physical Therapy Treatment Note/ Progress Report:     Date:  2021    Patient Name:  Kiera Zhou    :  1940  MRN: [id number]  Medical/Treatment Diagnosis Information:  · Diagnosis: Chronic neck pain (M65.2, G89.29)  · Treatment Diagnosis: Chronic neck pain  Insurance/Certification information:  PT Insurance Information: [employer] - $147 deductible (met), $147 OOP max (met), $0 co-pay, 100% co-insurance, PT visits based on [place] - requires auth through [facility]  Physician Information:  Referring Practitioner: Dr. Emily Howard of care signed (Y/N):     Date of Patient follow up with Physician: prn     Progress Report: []  Yes  [x]  No     Functional Scale: NDI = 34%   Date: 21    Date Range for reporting period:  Beginnin21  Ending:      Progress report due (10 Rx/or 30 days whichever is less): 73    Recertification due (POC duration/ or 90 days whichever is less): 21    Visit # Insurance Allowable Auth Needed   3 total  2/4 4 visits approved after eval 6/10 -  [x]Yes    []No     Pain level:  7-8/10 L side of neck    SUBJECTIVE:  Pt reports that neck was painful after last session, but headache on R side of headache was better. Pt reports having headache on L side later that day though.         OBJECTIVE: See eval   Observation:    Test measurements:      RESTRICTIONS/PRECAUTIONS: hx of C3-4 ACDF , hx of neck sx  (pt unsure, but thinks C6-7 and possibly discectomy?)    Exercises/Interventions:   Therapeutic Ex 10' Wt / Resistance Sets/sec Reps Notes          Seated cervical retraction  1 10    Scap retraction  1 10    Upper trap stretch  20\" 3 L only   Levator scap stretch  20\" 3 L only   TB rows Held today                                                                            Therapeutic Activities                                                                      Manual Intervention 30'    **Pt requires multiple pillows when lying supine for comfort**   Shld /GH Mobs       Post Cap mobs       Suboccipital release 5'      CT MT/Mobs 10'   C5-7 CPA mobs in supine  Gr I-II   STM 15'   B cervical paraspinals, B suboccipitals                 NMR re-education                                                                   Therapeutic Exercise and NMR EXR  [x] (75288) Provided verbal/tactile cueing for activities related to strengthening, flexibility, endurance, ROM  for improvements in scapular, scapulothoracic and UE control with self care, reaching, carrying, lifting, house/yardwork, driving/computer work.    [] (89992) Provided verbal/tactile cueing for activities related to improving balance, coordination, kinesthetic sense, posture, motor skill, proprioception  to assist with  scapular, scapulothoracic and UE control with self care, reaching, carrying, lifting, house/yardwork, driving/computer work. Therapeutic Activities:    [] (35451 or 86189) Provided verbal/tactile cueing for activities related to improving balance, coordination, kinesthetic sense, posture, motor skill, proprioception and motor activation to allow for proper function of scapular, scapulothoracic and UE control with self care, carrying, lifting, driving/computer work.      Home Exercise Program:    [x] (42070) Reviewed/Progressed HEP activities related to strengthening, flexibility, endurance, ROM of scapular, scapulothoracic and UE control with self care, reaching, carrying, lifting, house/yardwork, driving/computer work  [] (14815) Reviewed/Progressed HEP activities related to improving balance, coordination, kinesthetic sense, posture, motor skill, proprioception of scapular, scapulothoracic and UE control with self care, reaching, carrying, lifting, house/yardwork, driving/computer work      Manual Treatments:  PROM / STM / Oscillations-Mobs:  G-I, II, III, IV (PA's, Inf., Post.)  [x] (27852) Provided manual therapy to mobilize soft tissue/joints of cervical/CT, scapular GHJ and UE for the purpose of modulating pain, promoting relaxation,  increasing ROM, reducing/eliminating soft tissue swelling/inflammation/restriction, improving soft tissue extensibility and allowing for proper ROM for normal function with self care, reaching, carrying, lifting, house/yardwork, driving/computer work    Modalities: Discussed use of heat     Charges:  Timed Code Treatment Minutes: 40   Total Treatment Minutes: 45       [] EVAL (LOW) 60217 (typically 20 minutes face-to-face)  [] EVAL (MOD) 41191 (typically 30 minutes face-to-face)  [] EVAL (HIGH) 04074 (typically 45 minutes face-to-face)  [] RE-EVAL     [x] RF(63812) x1     [] IONTO (85884)  [] NMR (99500) x     [] VASO (68669)  [x] Manual (75877) x 2    [] Other:  [] TA (48413)x     [] Mech Traction (03997)  [] ES(attended) (02366)     [] ES (un) (86871): If BWC Please Indicate Time In/Out and Total Minutes  CPT Code Time in Time out Total Min                                              GOALS:  Patient stated goal: \"To have more days that are painfree\"  []? Progressing: []? Met: []? Not Met: []? Adjusted     Therapist goals for Patient:   Short Term Goals: To be achieved in: 2 weeks  1. Independent in HEP and progression per patient tolerance, in order to prevent re-injury. []? Progressing: []? Met: []? Not Met: []? Adjusted     2. Patient will have a decrease in pain to facilitate improvement in movement, function, and ADLs as indicated by Functional Deficits. []? Progressing: []? Met: []? Not Met: []? Adjusted     Long Term Goals: To be achieved in: 4 weeks  1. Disability index score of 20% or less for the NDI to assist with reaching prior level of function. []? Progressing: []? Met: []? Not Met: []? Adjusted  2.  Patient will demonstrate increased AROM to [facility] of cervical/thoracic spine to allow for proper joint functioning as indicated by patients Functional Deficits. []? Progressing: []? Met: []? Not Met: []? Adjusted  3. Patient will demonstrate an increase in postural awareness and control and activation of  Deep cervical stabilizers to allow for proper functional mobility as indicated by patients Functional Deficits. []? Progressing: []? Met: []? Not Met: []? Adjusted  4. Patient will return to turning her head in order to drive without increased symptoms or restriction. []? Progressing: []? Met: []? Not Met: []? Adjusted   5. Patient will be able to walk for 30 minutes without increased symptoms or restriction. []? Progressing: []? Met: []? Not Met: []? Adjusted         Progression Towards Functional goals:  [] Patient is progressing as expected towards functional goals listed. [] Progression is slowed due to complexities listed. [] Progression has been slowed due to co-morbidities. [x] Plan just implemented, too soon to assess goals progression  [] Other:     ASSESSMENT: Pt continues to respond well to manual therapy with decreased reports of neck pain afterwards. Good response to suboccipital release with decreased intensity of headache. Pt required cues with upper trap stretch to perform with correct form and to decrease compensation. Return to Play: (if applicable)   []  Stage 1: Intro to Strength   []  Stage 2: Dynamic Strength and Intro to Plyometrics   []  Stage 3: Advanced Plyometrics and Intro to Throwing   []  Stage 4: Sport specific Training/Return to Sport     []  Ready to Return to Play, [employer]   []  Not Ready for Return to Sports   Comments:      Treatment/Activity Tolerance:  [x] Patient tolerated treatment well [] Patient limited by fatique  [] Patient limited by pain  [] Patient limited by other medical complications  [] Other:     Overall Progression Towards Functional goals/ Treatment Progress Update:  [] Patient is progressing as expected towards functional goals listed.     [] Progression is slowed due to complexities/Impairments listed. [] Progression has been slowed due to co-morbidities. [x] Plan just implemented, too soon to assess goals progression <30days   [] Goals require adjustment due to lack of progress  [] Patient is not progressing as expected and requires additional follow up with physician  [] Other    Prognosis for POC: [] Good [x] Fair  [] Poor    Patient requires continued skilled intervention: [x] Yes  [] No      PLAN: Decrease neck pain and headache. [x] Continue per plan of care [] Alter current plan (see comments)  [] Plan of care initiated [] Hold pending MD visit [] Discharge    Electronically signed by: Odette Ponce PT     Note: If patient does not return for scheduled/recommended follow up visits, this note will serve as a discharge from care along with the most recent update on progress.

## 2021-06-23 ENCOUNTER — HOSPITAL ENCOUNTER (OUTPATIENT)
Dept: PHYSICAL THERAPY | Age: 81
Setting detail: THERAPIES SERIES
Discharge: HOME | End: 2021-06-23
Payer: MEDICARE

## 2021-06-23 PROCEDURE — 97110 THERAPEUTIC EXERCISES: CPT

## 2021-06-23 PROCEDURE — 97140 MANUAL THERAPY 1/> REGIONS: CPT

## 2021-06-23 NOTE — FLOWSHEET NOTE
Manual Treatments:  PROM / STM / Oscillations-Mobs:  G-I, II, III, IV (PA's, Inf., Post.)  [x] (65435) Provided manual therapy to mobilize soft tissue/joints of cervical/CT, scapular GHJ and UE for the purpose of modulating pain, promoting relaxation,  increasing ROM, reducing/eliminating soft tissue swelling/inflammation/restriction, improving soft tissue extensibility and allowing for proper ROM for normal function with self care, reaching, carrying, lifting, house/yardwork, driving/computer work    Modalities: Declined - pt will use heat at home    Charges:  Timed Code Treatment Minutes: 45   Total Treatment Minutes: 45       [] EVAL (LOW) 01053 (typically 20 minutes face-to-face)  [] EVAL (MOD) 23732 (typically 30 minutes face-to-face)  [] EVAL (HIGH) 94073 (typically 45 minutes face-to-face)  [] RE-EVAL     [x] EP(99094) x1     [] IONTO (16592)  [] NMR (88479) x     [] VASO (24941)  [x] Manual (78694) x 2    [] Other:  [] TA (62435)x     [] Mech Traction (35231)  [] ES(attended) (34836)     [] ES (un) (21599): If BWC Please Indicate Time In/Out and Total Minutes  CPT Code Time in Time out Total Min                                              GOALS:  Patient stated goal: \"To have more days that are painfree\"  []? Progressing: []? Met: []? Not Met: []? Adjusted     Therapist goals for Patient:   Short Term Goals: To be achieved in: 2 weeks  1. Independent in HEP and progression per patient tolerance, in order to prevent re-injury. []? Progressing: []? Met: []? Not Met: []? Adjusted     2. Patient will have a decrease in pain to facilitate improvement in movement, function, and ADLs as indicated by Functional Deficits. []? Progressing: []? Met: []? Not Met: []? Adjusted     Long Term Goals: To be achieved in: 4 weeks  1. Disability index score of 20% or less for the NDI to assist with reaching prior level of function. []? Progressing: []? Met: []? Not Met: []? Adjusted  2.  Patient will demonstrate increased AROM to [facility] of cervical/thoracic spine to allow for proper joint functioning as indicated by patients Functional Deficits. []? Progressing: []? Met: []? Not Met: []? Adjusted  3. Patient will demonstrate an increase in postural awareness and control and activation of  Deep cervical stabilizers to allow for proper functional mobility as indicated by patients Functional Deficits. []? Progressing: []? Met: []? Not Met: []? Adjusted  4. Patient will return to turning her head in order to drive without increased symptoms or restriction. []? Progressing: []? Met: []? Not Met: []? Adjusted   5. Patient will be able to walk for 30 minutes without increased symptoms or restriction. []? Progressing: []? Met: []? Not Met: []? Adjusted         Progression Towards Functional goals:  [] Patient is progressing as expected towards functional goals listed. [] Progression is slowed due to complexities listed. [] Progression has been slowed due to co-morbidities. [x] Plan just implemented, too soon to assess goals progression  [] Other:     ASSESSMENT: Pt continues to respond well to manual therapy with decreased reports of neck pain afterwards. Pt demonstrates improved cervical AROM in all directions, showing progress towards LTG's. However, limited long term carryover noted between sessions. Added open books and corner pec stretch for improved thoracic mobility and flexibility today.       Return to Play: (if applicable)   []  Stage 1: Intro to Strength   []  Stage 2: Dynamic Strength and Intro to Plyometrics   []  Stage 3: Advanced Plyometrics and Intro to Throwing   []  Stage 4: Sport specific Training/Return to Sport     []  Ready to Return to Play, [employer]   []  Not Ready for Return to Sports   Comments:      Treatment/Activity Tolerance:  [x] Patient tolerated treatment well [] Patient limited by fatique  [] Patient limited by pain  [] Patient limited by other medical complications  [] Other:     Overall Progression Towards Functional goals/ Treatment Progress Update:  [] Patient is progressing as expected towards functional goals listed. [] Progression is slowed due to complexities/Impairments listed. [] Progression has been slowed due to co-morbidities. [x] Plan just implemented, too soon to assess goals progression <30days   [] Goals require adjustment due to lack of progress  [] Patient is not progressing as expected and requires additional follow up with physician  [] Other    Prognosis for POC: [] Good [x] Fair  [] Poor    Patient requires continued skilled intervention: [x] Yes  [] No      PLAN: Decrease neck pain and headache. [x] Continue per plan of care [] Alter current plan (see comments)  [] Plan of care initiated [] Hold pending MD visit [] Discharge    Electronically signed by: Jesi Thibodeaux PT     Note: If patient does not return for scheduled/recommended follow up visits, this note will serve as a discharge from care along with the most recent update on progress.

## 2021-06-28 ENCOUNTER — HOSPITAL ENCOUNTER (OUTPATIENT)
Dept: PHYSICAL THERAPY | Age: 81
Setting detail: THERAPIES SERIES
Discharge: HOME | End: 2021-06-28
Payer: MEDICARE

## 2021-06-28 PROCEDURE — 97110 THERAPEUTIC EXERCISES: CPT

## 2021-06-28 PROCEDURE — 97140 MANUAL THERAPY 1/> REGIONS: CPT

## 2021-06-28 NOTE — FLOWSHEET NOTE
[employer] [facility] [employer] [facility]    Physical Therapy Treatment Note/ Progress Report:     Date:  2021    Patient Name:  Zan Valencia    :  1940  MRN: [id number]  Medical/Treatment Diagnosis Information:  · Diagnosis: Chronic neck pain (M65.2, G89.29)  · Treatment Diagnosis: Chronic neck pain  Insurance/Certification information:  PT Insurance Information: [employer] - $147 deductible (met), $147 OOP max (met), $0 co-pay, 100% co-insurance, PT visits based on MN - requires auth through [facility]  Physician Information:  Referring Practitioner: Dr. Yuni Maya of care signed (Y/N):     Date of Patient follow up with Physician: prn     Progress Report: []  Yes  [x]  No     Functional Scale: NDI = 34%   Date: 21    Date Range for reporting period:  Beginnin21  Ending:      Progress report due (10 Rx/or 30 days whichever is less): 51    Recertification due (POC duration/ or 90 days whichever is less): 21    Visit # Insurance Allowable Auth Needed   5 total   4 visits approved after eval 6/10 -  [x]Yes    []No     Pain level:  4/10 L side of neck    SUBJECTIVE:  Pt reports that she has some soreness the day after PT usually, but then her neck feels better for about 2 days once the soreness wears off. Pt states that she does feel like she has less frequent headaches, but still notices about 1x/day. Pt states that she does feel like PT is helping overall.        OBJECTIVE: See eval   Observation:    Test measurements:     21:   Cervical AROM: flex = 30, ext = 30, SB = 20 B, rot = 34 R, 30 L   21:  CERV ROM        Cervical Flexion 35     Cervical Extension 40       Left Right   Cervical SB 30 *pn 25   Cervical rotation 32 *pn 50      NDI = 28% disability    RESTRICTIONS/PRECAUTIONS: hx of C3-4 ACDF , hx of neck sx  (pt unsure, but thinks C6-7 and possibly discectomy?)    Exercises/Interventions: Therapeutic Ex 15' Wt / Resistance Sets/sec Reps Notes          Seated cervical retraction  1 10    Scap retraction  1 10    Upper trap stretch  20\" 3 L only   Levator scap stretch  20\" 3 L only   TB rows Held today   Corner pec stretch  10\" 5x    S/L open books w/foam roller under top leg  1 5x ea                                                               Therapeutic Activities                                                                      Manual Intervention 30'    **Pt requires multiple pillows when lying supine for comfort**   Shld /GH Mobs       Post Cap mobs       Suboccipital release 5'      CT MT/Mobs 10'   C5-7 CPA mobs in supine  Gr I-II   STM 15'   B cervical paraspinals, B suboccipitals                 NMR re-education                                                                   Therapeutic Exercise and NMR EXR  [x] (21925) Provided verbal/tactile cueing for activities related to strengthening, flexibility, endurance, ROM  for improvements in scapular, scapulothoracic and UE control with self care, reaching, carrying, lifting, house/yardwork, driving/computer work.    [] (38424) Provided verbal/tactile cueing for activities related to improving balance, coordination, kinesthetic sense, posture, motor skill, proprioception  to assist with  scapular, scapulothoracic and UE control with self care, reaching, carrying, lifting, house/yardwork, driving/computer work. Therapeutic Activities:    [] (75414 or 50791) Provided verbal/tactile cueing for activities related to improving balance, coordination, kinesthetic sense, posture, motor skill, proprioception and motor activation to allow for proper function of scapular, scapulothoracic and UE control with self care, carrying, lifting, driving/computer work.      Home Exercise Program:    [x] (95193) Reviewed/Progressed HEP activities related to strengthening, flexibility, endurance, ROM of scapular, scapulothoracic and UE control with self care, reaching, carrying, lifting, house/yardwork, driving/computer work  [] (61860) Reviewed/Progressed HEP activities related to improving balance, coordination, kinesthetic sense, posture, motor skill, proprioception of scapular, scapulothoracic and UE control with self care, reaching, carrying, lifting, house/yardwork, driving/computer work      Manual Treatments:  PROM / STM / Oscillations-Mobs:  G-I, II, III, IV (PA's, Inf., Post.)  [x] (50350) Provided manual therapy to mobilize soft tissue/joints of cervical/CT, scapular GHJ and UE for the purpose of modulating pain, promoting relaxation,  increasing ROM, reducing/eliminating soft tissue swelling/inflammation/restriction, improving soft tissue extensibility and allowing for proper ROM for normal function with self care, reaching, carrying, lifting, house/yardwork, driving/computer work    Modalities: Declined - pt will use heat at home    Charges:  Timed Code Treatment Minutes: 45   Total Treatment Minutes: 45       [] EVAL (LOW) 07135 (typically 20 minutes face-to-face)  [] EVAL (MOD) 29814 (typically 30 minutes face-to-face)  [] EVAL (HIGH) 67078 (typically 45 minutes face-to-face)  [] RE-EVAL     [x] FG(38285) x1     [] IONTO (43702)  [] NMR (17780) x     [] VASO (00019)  [x] Manual (90321) x 2    [] Other:  [] TA (61163)x     [] Mech Traction (97359)  [] ES(attended) (97244)     [] ES (un) (96853): If BWC Please Indicate Time In/Out and Total Minutes  CPT Code Time in Time out Total Min                                              GOALS:  Patient stated goal: \"To have more days that are painfree\"  [x]? Progressing: []? Met: []? Not Met: []? Adjusted     Therapist goals for Patient:   Short Term Goals: To be achieved in: 2 weeks  1. Independent in HEP and progression per patient tolerance, in order to prevent re-injury. [x]? Progressing: []? Met: []? Not Met: []? Adjusted     2.  Patient will have a decrease in pain to facilitate improvement in movement, function, and ADLs as indicated by Functional Deficits. [x]? Progressing: []? Met: []? Not Met: []? Adjusted     Long Term Goals: To be achieved in: 4 weeks  1. Disability index score of 20% or less for the NDI to assist with reaching prior level of function. [x]? Progressing: []? Met: []? Not Met: []? Adjusted  2. Patient will demonstrate increased AROM to [facility] of cervical/thoracic spine to allow for proper joint functioning as indicated by patients Functional Deficits. [x]? Progressing: []? Met: []? Not Met: []? Adjusted  3. Patient will demonstrate an increase in postural awareness and control and activation of  Deep cervical stabilizers to allow for proper functional mobility as indicated by patients Functional Deficits. [x]? Progressing: []? Met: []? Not Met: []? Adjusted  4. Patient will return to turning her head in order to drive without increased symptoms or restriction. [x]? Progressing: []? Met: []? Not Met: []? Adjusted   5. Patient will be able to walk for 30 minutes without increased symptoms or restriction. [x]? Progressing: []? Met: []? Not Met: []? Adjusted         Progression Towards Functional goals:  [x] Patient is progressing as expected towards functional goals listed. [] Progression is slowed due to complexities listed. [] Progression has been slowed due to co-morbidities. [] Plan just implemented, too soon to assess goals progression  [] Other:     ASSESSMENT: Pt continues to respond well to manual therapy with decreased reports of neck pain afterwards. Pt requires significant cues to decrease UT compensation throughout session and for appropriate form with UT and LS stretches. Pt demonstrates improved cervical AROM, showing progress towards LTG's.      Return to Play: (if applicable)   []  Stage 1: Intro to Strength   []  Stage 2: Dynamic Strength and Intro to Plyometrics   []  Stage 3: Advanced Plyometrics and Intro to Throwing   []  Stage 4: Sport specific Training/Return to Sport     []  Ready to Return to Play, [employer]   []  Not Ready for Return to Sports   Comments:      Treatment/Activity Tolerance:  [x] Patient tolerated treatment well [] Patient limited by fatique  [] Patient limited by pain  [] Patient limited by other medical complications  [] Other:     Overall Progression Towards Functional goals/ Treatment Progress Update:  [x] Patient is progressing as expected towards functional goals listed. [] Progression is slowed due to complexities/Impairments listed. [] Progression has been slowed due to co-morbidities. [] Plan just implemented, too soon to assess goals progression <30days   [] Goals require adjustment due to lack of progress  [] Patient is not progressing as expected and requires additional follow up with physician  [] Other    Prognosis for POC: [] Good [x] Fair  [] Poor    Patient requires continued skilled intervention: [x] Yes  [] No      PLAN: Decrease neck pain and headache. Pt requires insurance auth for more visits. [x] Continue per plan of care [] Alter current plan (see comments)  [] Plan of care initiated [] Hold pending MD visit [] Discharge    Electronically signed by: Sol Stockton, PT     Note: If patient does not return for scheduled/recommended follow up visits, this note will serve as a discharge from care along with the most recent update on progress.

## 2021-06-30 ENCOUNTER — HOSPITAL ENCOUNTER (OUTPATIENT)
Dept: PHYSICAL THERAPY | Age: 81
Setting detail: THERAPIES SERIES
Discharge: HOME | End: 2021-06-30
Payer: MEDICARE

## 2021-06-30 PROCEDURE — 97110 THERAPEUTIC EXERCISES: CPT

## 2021-06-30 PROCEDURE — 97140 MANUAL THERAPY 1/> REGIONS: CPT

## 2021-06-30 NOTE — FLOWSHEET NOTE
[facility] [facility]    Physical Therapy Treatment Note/ Progress Report:     Date:  2021    Patient Name:  Ariadna Hudson    :  1940  MRN: [id number]  Medical/Treatment Diagnosis Information:  · Diagnosis: Chronic neck pain (M65.2, G89.29)  · Treatment Diagnosis: Chronic neck pain  Insurance/Certification information:  PT Insurance Information: [employer] - $147 deductible (met), $147 OOP max (met), $0 co-pay, 100% co-insurance, PT visits based on MN - requires auth through [employer]  Physician Information:  Referring Practitioner: Dr. Andrea Reap of care signed (Y/N):     Date of Patient follow up with Physician: prn     Progress Report: []  Yes  [x]  No     Functional Scale: NDI = 34%   Date: 21    Date Range for reporting period:  Beginnin21  Ending:      Progress report due (10 Rx/or 30 days whichever is less): 7/3/70    Recertification due (POC duration/ or 90 days whichever is less): 21    Visit # Insurance Allowable Auth Needed    8 visits approved  -  [x]Yes    []No     Pain level:  4/10 L side of neck    SUBJECTIVE:  Pt reports that yesterday was the best day she has had in a while. Pt states that she has not had any headaches since last visit. Pt states that she was able to do some yardwork yesterday and whole body was very fatigued. Pt does reports having L sided neck pain upon waking up this morning, but not sure if it's related to how she slept or to the yardwork from yesterday.      OBJECTIVE: See eval   Observation:    Test measurements:     21:   Cervical AROM: flex = 30, ext = 30, SB = 20 B, rot = 34 R, 30 L   21:  CERV ROM        Cervical Flexion 35     Cervical Extension 40       Left Right   Cervical SB 30 *pn 25   Cervical rotation 32 *pn 50      NDI = 28% disability    RESTRICTIONS/PRECAUTIONS: hx of C3-4 ACDF , hx of neck sx  (pt unsure, but thinks C6-7 and possibly discectomy?)    Exercises/Interventions:   Therapeutic Ex 20' Wt / Resistance Sets/sec Reps Notes          Seated cervical retraction  1 10    Scap retraction  1 10    Upper trap stretch  20\" 3 L only   Levator scap stretch  20\" 3 L only   TB rows yellow 1 10 Cues to decrease UT compensation   No monies at wall No resistance 1 10 Cues to decrease UT compensation   Corner pec stretch  10\" 5x    S/L open books w/foam roller under top leg  1 5x ea    3 finger cervical rotation    NPV? Therapeutic Activities                                                                      Manual Intervention 30'    **Pt requires 3 pillows when lying supine for comfort**   Shld /GH Mobs       Post Cap mobs       Suboccipital release 5'      CT MT/Mobs 10'      5'   C5-7 CPA mobs in supine  Gr I-II  C5 sideglides L to R   STM 8'   B cervical paraspinals, B suboccipitals, L UT   MWM L cervical rotation   1 5x           NMR re-education                                                                   Therapeutic Exercise and NMR EXR  [x] (23133) Provided verbal/tactile cueing for activities related to strengthening, flexibility, endurance, ROM  for improvements in scapular, scapulothoracic and UE control with self care, reaching, carrying, lifting, house/yardwork, driving/computer work.    [] (79952) Provided verbal/tactile cueing for activities related to improving balance, coordination, kinesthetic sense, posture, motor skill, proprioception  to assist with  scapular, scapulothoracic and UE control with self care, reaching, carrying, lifting, house/yardwork, driving/computer work.     Therapeutic Activities:    [] (01445 or 38404) Provided verbal/tactile cueing for activities related to improving balance, coordination, kinesthetic sense, posture, motor skill, proprioception and motor activation to allow for proper function of scapular, scapulothoracic and UE control with self care, carrying, lifting, driving/computer work. Home Exercise Program:    [x] (83799) Reviewed/Progressed HEP activities related to strengthening, flexibility, endurance, ROM of scapular, scapulothoracic and UE control with self care, reaching, carrying, lifting, house/yardwork, driving/computer work  [] (42186) Reviewed/Progressed HEP activities related to improving balance, coordination, kinesthetic sense, posture, motor skill, proprioception of scapular, scapulothoracic and UE control with self care, reaching, carrying, lifting, house/yardwork, driving/computer work      Manual Treatments:  PROM / STM / Oscillations-Mobs:  G-I, II, III, IV (PA's, Inf., Post.)  [x] (26603) Provided manual therapy to mobilize soft tissue/joints of cervical/CT, scapular GHJ and UE for the purpose of modulating pain, promoting relaxation,  increasing ROM, reducing/eliminating soft tissue swelling/inflammation/restriction, improving soft tissue extensibility and allowing for proper ROM for normal function with self care, reaching, carrying, lifting, house/yardwork, driving/computer work    Modalities: Declined - pt will use heat at home    Charges:  Timed Code Treatment Minutes: 50   Total Treatment Minutes: 50       [] EVAL (LOW) 26818 (typically 20 minutes face-to-face)  [] EVAL (MOD) 86938 (typically 30 minutes face-to-face)  [] EVAL (HIGH) 17432 (typically 45 minutes face-to-face)  [] RE-EVAL     [x] KL(98230) x1     [] IONTO (80762)  [] NMR (40212) x     [] VASO (74393)  [x] Manual (83723) x 2    [] Other:  [] TA (06450)x     [] Mech Traction (22482)  [] ES(attended) (15521)     [] ES (un) (38050): If BWC Please Indicate Time In/Out and Total Minutes  CPT Code Time in Time out Total Min                                              GOALS:  Patient stated goal: \"To have more days that are painfree\"  [x]? Progressing: []? Met: []? Not Met: []? Adjusted     Therapist goals for Patient:   Short Term Goals:  To be achieved in: 2 weeks  1. Independent in HEP and progression per patient tolerance, in order to prevent re-injury. [x]? Progressing: []? Met: []? Not Met: []? Adjusted     2. Patient will have a decrease in pain to facilitate improvement in movement, function, and ADLs as indicated by Functional Deficits. [x]? Progressing: []? Met: []? Not Met: []? Adjusted     Long Term Goals: To be achieved in: 4 weeks  1. Disability index score of 20% or less for the NDI to assist with reaching prior level of function. [x]? Progressing: []? Met: []? Not Met: []? Adjusted  2. Patient will demonstrate increased AROM to [facility] of cervical/thoracic spine to allow for proper joint functioning as indicated by patients Functional Deficits. [x]? Progressing: []? Met: []? Not Met: []? Adjusted  3. Patient will demonstrate an increase in postural awareness and control and activation of  Deep cervical stabilizers to allow for proper functional mobility as indicated by patients Functional Deficits. [x]? Progressing: []? Met: []? Not Met: []? Adjusted  4. Patient will return to turning her head in order to drive without increased symptoms or restriction. [x]? Progressing: []? Met: []? Not Met: []? Adjusted   5. Patient will be able to walk for 30 minutes without increased symptoms or restriction. [x]? Progressing: []? Met: []? Not Met: []? Adjusted         Progression Towards Functional goals:  [x] Patient is progressing as expected towards functional goals listed. [] Progression is slowed due to complexities listed. [] Progression has been slowed due to co-morbidities. [] Plan just implemented, too soon to assess goals progression  [] Other:     ASSESSMENT: Decreased TTP and muscle spasms along L cervical paraspinals and L UT today. Hypomobility with C5-7 CPA mobs and with C5 R sideglides. Improved ability to perform cervical rotation B (L particularly improved) after manual therapy today.  Pt requires significant verbal and tactile cues for appropriate periscapular activation with TB rows and no monies, and to decrease UT compensation. Return to Play: (if applicable)   []  Stage 1: Intro to Strength   []  Stage 2: Dynamic Strength and Intro to Plyometrics   []  Stage 3: Advanced Plyometrics and Intro to Throwing   []  Stage 4: Sport specific Training/Return to Sport     []  Ready to Return to Play, [employer]   []  Not Ready for Return to Sports   Comments:      Treatment/Activity Tolerance:  [x] Patient tolerated treatment well [] Patient limited by fatique  [] Patient limited by pain  [] Patient limited by other medical complications  [] Other:     Overall Progression Towards Functional goals/ Treatment Progress Update:  [x] Patient is progressing as expected towards functional goals listed. [] Progression is slowed due to complexities/Impairments listed. [] Progression has been slowed due to co-morbidities. [] Plan just implemented, too soon to assess goals progression <30days   [] Goals require adjustment due to lack of progress  [] Patient is not progressing as expected and requires additional follow up with physician  [] Other    Prognosis for POC: [] Good [x] Fair  [] Poor    Patient requires continued skilled intervention: [x] Yes  [] No      PLAN: Decrease neck pain and headache. Assess response to progressions today. [x] Continue per plan of care [] Alter current plan (see comments)  [] Plan of care initiated [] Hold pending MD visit [] Discharge    Electronically signed by: Reyes Shrestha PT     Note: If patient does not return for scheduled/recommended follow up visits, this note will serve as a discharge from care along with the most recent update on progress.

## 2021-07-07 ENCOUNTER — HOSPITAL ENCOUNTER (OUTPATIENT)
Dept: PHYSICAL THERAPY | Age: 81
Setting detail: THERAPIES SERIES
Discharge: HOME | End: 2021-07-07
Payer: MEDICARE

## 2021-07-07 PROCEDURE — 97110 THERAPEUTIC EXERCISES: CPT

## 2021-07-07 PROCEDURE — 97140 MANUAL THERAPY 1/> REGIONS: CPT

## 2021-07-07 NOTE — FLOWSHEET NOTE
[facility] [employer]    Physical Therapy Treatment Note/ Progress Report:     Date:  2021    Patient Name:  Mark Sanchez    :  1940  MRN: [id number]  Medical/Treatment Diagnosis Information:  · Diagnosis: Chronic neck pain (M65.2, G89.29)  · Treatment Diagnosis: Chronic neck pain  Insurance/Certification information:  PT Insurance Information: [employer] - $147 deductible (met), $147 OOP max (met), $0 co-pay, 100% co-insurance, PT visits based on [place] - [place] [facility] [place] Information:  Referring Practitioner: Dr. Moe Woods of care signed (Y/N):     Date of Patient follow up with Physician: prn     Progress Report: []  Yes  [x]  No     Functional Scale: NDI = 34%   Date: 21    Date Range for reporting period:  Beginnin21  Ending:      Progress report due (10 Rx/or 30 days whichever is less): 00    Recertification due (POC duration/ or 90 days whichever is less): 21    Visit # Insurance Allowable Auth Needed    8 visits approved  -  [x]Yes    []No     Pain level:  5/10 L side of neck    SUBJECTIVE:  Pt reports that she was watching fireworks over holiday weekend and was turning her head quite a bit to see the fireworks, reports having some L sided pain and soreness today as a result.  Pt states that she feels improved ability to rotate her neck, as well as less frequent and less intense headaches than prior to starting PT.      OBJECTIVE: See eval   Observation:    Test measurements:     21:   Cervical AROM: flex = 30, ext = 30, SB = 20 B, rot = 34 R, 30 L   21:  CERV ROM        Cervical Flexion 35     Cervical Extension 40       Left Right   Cervical SB 30 *pn 25   Cervical rotation 32 *pn 50      NDI = 28% disability    RESTRICTIONS/PRECAUTIONS: hx of C3-4 ACDF , hx of neck sx  (pt unsure, but thinks C6-7 and possibly discectomy?)    Exercises/Interventions:   Therapeutic Ex 20' Wt / Resistance Sets/sec Reps Notes          Seated cervical retraction  1 10    Scap retraction  1 10    Upper trap stretch  20\" 3 L only  HEP   Levator scap stretch  20\" 3 L only  HEP   TB rows yellow 1 10 Cues to decrease UT compensation   No monies at wall No resistance 1 10 Cues to decrease UT compensation   Corner pec stretch  10\" 5x    S/L open books w/foam roller under top leg  1 5x ea    3 finger cervical rotation  1 10                                                        Therapeutic Activities                                                                      Manual Intervention 30'    **Pt requires 3-4 pillows when lying supine for comfort**   Shld /GH Mobs       Post Cap mobs       Suboccipital release 5'      CT MT/Mobs 10'      5'   C5-7 CPA mobs in supine  Gr I-II  C5 sideglides L to R   STM 8'   B cervical paraspinals, B suboccipitals, L UT   MWM L cervical rotation   1 5x           NMR re-education                                                                   Therapeutic Exercise and NMR EXR  [x] (46009) Provided verbal/tactile cueing for activities related to strengthening, flexibility, endurance, ROM  for improvements in scapular, scapulothoracic and UE control with self care, reaching, carrying, lifting, house/yardwork, driving/computer work.    [] (77504) Provided verbal/tactile cueing for activities related to improving balance, coordination, kinesthetic sense, posture, motor skill, proprioception  to assist with  scapular, scapulothoracic and UE control with self care, reaching, carrying, lifting, house/yardwork, driving/computer work.     Therapeutic Activities:    [] (72487 or 22823) Provided verbal/tactile cueing for activities related to improving balance, coordination, kinesthetic sense, posture, motor skill, proprioception and motor activation to allow for proper function of scapular, scapulothoracic and UE control with self care, carrying, lifting, driving/computer work. Home Exercise Program:    [x] (66786) Reviewed/Progressed HEP activities related to strengthening, flexibility, endurance, ROM of scapular, scapulothoracic and UE control with self care, reaching, carrying, lifting, house/yardwork, driving/computer work  [] (11775) Reviewed/Progressed HEP activities related to improving balance, coordination, kinesthetic sense, posture, motor skill, proprioception of scapular, scapulothoracic and UE control with self care, reaching, carrying, lifting, house/yardwork, driving/computer work      Manual Treatments:  PROM / STM / Oscillations-Mobs:  G-I, II, III, IV (PA's, Inf., Post.)  [x] (01235) Provided manual therapy to mobilize soft tissue/joints of cervical/CT, scapular GHJ and UE for the purpose of modulating pain, promoting relaxation,  increasing ROM, reducing/eliminating soft tissue swelling/inflammation/restriction, improving soft tissue extensibility and allowing for proper ROM for normal function with self care, reaching, carrying, lifting, house/yardwork, driving/computer work    Modalities: Declined - pt will use heat at home    Charges:  Timed Code Treatment Minutes: 50   Total Treatment Minutes: 50       [] EVAL (LOW) 00267 (typically 20 minutes face-to-face)  [] EVAL (MOD) 29911 (typically 30 minutes face-to-face)  [] EVAL (HIGH) 91970 (typically 45 minutes face-to-face)  [] RE-EVAL     [x] NZ(53498) x1     [] IONTO (72740)  [] NMR (11439) x     [] VASO (07764)  [x] Manual (58003) x 2    [] Other:  [] TA (47829)x     [] Mech Traction (83116)  [] ES(attended) (79823)     [] ES (un) (03980): If BWC Please Indicate Time In/Out and Total Minutes  CPT Code Time in Time out Total Min                                              GOALS:  Patient stated goal: \"To have more days that are painfree\"  [x]? Progressing: []? Met: []? Not Met: []? Adjusted     Therapist goals for Patient:   Short Term Goals:  To be achieved in: 2 weeks  1. Independent in HEP and progression per patient tolerance, in order to prevent re-injury. [x]? Progressing: []? Met: []? Not Met: []? Adjusted     2. Patient will have a decrease in pain to facilitate improvement in movement, function, and ADLs as indicated by Functional Deficits. [x]? Progressing: []? Met: []? Not Met: []? Adjusted     Long Term Goals: To be achieved in: 4 weeks  1. Disability index score of 20% or less for the NDI to assist with reaching prior level of function. [x]? Progressing: []? Met: []? Not Met: []? Adjusted  2. Patient will demonstrate increased AROM to [facility] of cervical/thoracic spine to allow for proper joint functioning as indicated by patients Functional Deficits. [x]? Progressing: []? Met: []? Not Met: []? Adjusted  3. Patient will demonstrate an increase in postural awareness and control and activation of  Deep cervical stabilizers to allow for proper functional mobility as indicated by patients Functional Deficits. [x]? Progressing: []? Met: []? Not Met: []? Adjusted  4. Patient will return to turning her head in order to drive without increased symptoms or restriction. [x]? Progressing: []? Met: []? Not Met: []? Adjusted   5. Patient will be able to walk for 30 minutes without increased symptoms or restriction. [x]? Progressing: []? Met: []? Not Met: []? Adjusted         Progression Towards Functional goals:  [x] Patient is progressing as expected towards functional goals listed. [] Progression is slowed due to complexities listed. [] Progression has been slowed due to co-morbidities. [] Plan just implemented, too soon to assess goals progression  [] Other:     ASSESSMENT:  Pt continues to respond well to manual therapy with improved cervical AROM especially cervical rotation noted afterwards. Pt requires improved periscapular control and activation with TB rows today with initial verbal and tactile cues required to decrease UT compensation.      Return to Play: (if applicable)   []  Stage 1: Intro to Strength   []  Stage 2: Dynamic Strength and Intro to Plyometrics   []  Stage 3: Advanced Plyometrics and Intro to Throwing   []  Stage 4: Sport specific Training/Return to Sport     []  Ready to Return to Play, [employer]   []  Not Ready for Return to Sports   Comments:      Treatment/Activity Tolerance:  [x] Patient tolerated treatment well [] Patient limited by fatique  [] Patient limited by pain  [] Patient limited by other medical complications  [] Other:     Overall Progression Towards Functional goals/ Treatment Progress Update:  [x] Patient is progressing as expected towards functional goals listed. [] Progression is slowed due to complexities/Impairments listed. [] Progression has been slowed due to co-morbidities. [] Plan just implemented, too soon to assess goals progression <30days   [] Goals require adjustment due to lack of progress  [] Patient is not progressing as expected and requires additional follow up with physician  [] Other    Prognosis for POC: [] Good [x] Fair  [] Poor    Patient requires continued skilled intervention: [x] Yes  [] No      PLAN: Decrease neck pain and headache. Assess response to progressions today. [x] Continue per plan of care [] Alter current plan (see comments)  [] Plan of care initiated [] Hold pending MD visit [] Discharge    Electronically signed by: Kellie Morris PT     Note: If patient does not return for scheduled/recommended follow up visits, this note will serve as a discharge from care along with the most recent update on progress.

## 2021-07-12 ENCOUNTER — HOSPITAL ENCOUNTER (OUTPATIENT)
Dept: PHYSICAL THERAPY | Age: 81
Setting detail: THERAPIES SERIES
Discharge: HOME | End: 2021-07-12
Payer: MEDICARE

## 2021-07-12 PROCEDURE — 97140 MANUAL THERAPY 1/> REGIONS: CPT

## 2021-07-12 PROCEDURE — 97110 THERAPEUTIC EXERCISES: CPT

## 2021-07-12 NOTE — FLOWSHEET NOTE
[facility] [employer]    Physical Therapy Treatment Note/ Progress Report:     Date:  2021    Patient Name:  Nati Lemus    :  1940  MRN: [id number]  Medical/Treatment Diagnosis Information:  · Diagnosis: Chronic neck pain (M65.2, G89.29)  · Treatment Diagnosis: Chronic neck pain  Insurance/Certification information:  PT Insurance Information: [employer] - $147 deductible (met), $147 OOP max (met), $0 co-pay, 100% co-insurance, PT visits based on [place] - [place] [facility] [place]  Physician Information:  Referring Practitioner: Dr. Mary Wyatt of care signed (Y/N):     Date of Patient follow up with Physician: prn     Progress Report: []  Yes  [x]  No     Functional Scale: NDI = 34%   Date: 21    Date Range for reporting period:  Beginnin21  Ending:      Progress report due (10 Rx/or 30 days whichever is less): 74    Recertification due (POC duration/ or 90 days whichever is less): 21    Visit # Insurance Allowable Auth Needed   3/8 8 visits approved  -  [x]Yes    []No     Pain level:  5/10 L side of neck    SUBJECTIVE:  Pt reports she continues to have soreness in her neck, but headache intensity and frequency has improved. Pt reports that she does feel that PT has helped her pain, just has been slow progress.      OBJECTIVE: See eval   Observation:    Test measurements:     21:   Cervical AROM: flex = 30, ext = 30, SB = 20 B, rot = 34 R, 30 L   21:  CERV ROM        Cervical Flexion 35     Cervical Extension 40       Left Right   Cervical SB 30 *pn 25   Cervical rotation 32 *pn 50      NDI = 28% disability    RESTRICTIONS/PRECAUTIONS: hx of C3-4 ACDF , hx of neck sx  (pt unsure, but thinks C6-7 and possibly discectomy?)    Exercises/Interventions:   Therapeutic Ex 20' Wt / Resistance Sets/sec Reps Notes          Seated cervical retraction  HEP   Scap retraction  HEP   Upper Progressing: []? Met: []? Not Met: []? Adjusted     2. Patient will have a decrease in pain to facilitate improvement in movement, function, and ADLs as indicated by Functional Deficits. [x]? Progressing: []? Met: []? Not Met: []? Adjusted     Long Term Goals: To be achieved in: 4 weeks  1. Disability index score of 20% or less for the NDI to assist with reaching prior level of function. [x]? Progressing: []? Met: []? Not Met: []? Adjusted  2. Patient will demonstrate increased AROM to [facility] of cervical/thoracic spine to allow for proper joint functioning as indicated by patients Functional Deficits. [x]? Progressing: []? Met: []? Not Met: []? Adjusted  3. Patient will demonstrate an increase in postural awareness and control and activation of  Deep cervical stabilizers to allow for proper functional mobility as indicated by patients Functional Deficits. [x]? Progressing: []? Met: []? Not Met: []? Adjusted  4. Patient will return to turning her head in order to drive without increased symptoms or restriction. [x]? Progressing: []? Met: []? Not Met: []? Adjusted   5. Patient will be able to walk for 30 minutes without increased symptoms or restriction. [x]? Progressing: []? Met: []? Not Met: []? Adjusted         Progression Towards Functional goals:  [x] Patient is progressing as expected towards functional goals listed. [] Progression is slowed due to complexities listed. [] Progression has been slowed due to co-morbidities. [] Plan just implemented, too soon to assess goals progression  [] Other:     ASSESSMENT:  Pt continues to respond well to manual therapy, especially STM to cervical paraspinals and suboccipitals and with cervical sideglides to C5. Pt requires significant verbal and tactile cues for appropriate periscapular activation and to decrease UT compensation with TB exercises.      Return to Play: (if applicable)   []  Stage 1: Intro to Strength   []  Stage 2: Dynamic Strength and Intro to Plyometrics   []  Stage 3: Advanced Plyometrics and Intro to Throwing   []  Stage 4: Sport specific Training/Return to Sport     []  Ready to Return to Play, [employer]   []  Not Ready for Return to Sports   Comments:      Treatment/Activity Tolerance:  [x] Patient tolerated treatment well [] Patient limited by fatique  [] Patient limited by pain  [] Patient limited by other medical complications  [] Other:     Overall Progression Towards Functional goals/ Treatment Progress Update:  [x] Patient is progressing as expected towards functional goals listed. [] Progression is slowed due to complexities/Impairments listed. [] Progression has been slowed due to co-morbidities. [] Plan just implemented, too soon to assess goals progression <30days   [] Goals require adjustment due to lack of progress  [] Patient is not progressing as expected and requires additional follow up with physician  [] Other    Prognosis for POC: [] Good [x] Fair  [] Poor    Patient requires continued skilled intervention: [x] Yes  [] No      PLAN: Decrease neck pain and headaches. Improve scapular strength. [x] Continue per plan of care [] Alter current plan (see comments)  [] Plan of care initiated [] Hold pending MD visit [] Discharge    Electronically signed by: Deena Be PT     Note: If patient does not return for scheduled/recommended follow up visits, this note will serve as a discharge from care along with the most recent update on progress.

## 2021-07-14 ENCOUNTER — HOSPITAL ENCOUNTER (OUTPATIENT)
Dept: PHYSICAL THERAPY | Age: 81
Setting detail: THERAPIES SERIES
Discharge: HOME | End: 2021-07-14
Payer: MEDICARE

## 2021-07-14 PROCEDURE — 97110 THERAPEUTIC EXERCISES: CPT

## 2021-07-14 PROCEDURE — 97140 MANUAL THERAPY 1/> REGIONS: CPT

## 2021-07-14 NOTE — PROGRESS NOTES
[employer]     Physical Therapy Re-Certification Plan of Care    Dear Dr. Manoj Chinchilla,    We had the pleasure of treating the following patient for physical therapy services at [address] [place] [address]. A summary of our findings can be found in the updated assessment below. This includes our plan of care. If you have any questions or concerns regarding these findings, please do not hesitate to contact me at the office phone number checked above. Thank you for the referral.     Physician Signature:________________________________Date:__________________  By signing above (or electronic signature), therapists plan is approved by physician    Date Range Of Visits: 21 - 21  Total Visits to Date: 8  Overall Response to Treatment:   [x]Patient is responding well to treatment and improvement is noted with regards  to goals   []Patient should continue to improve in reasonable time if they continue HEP   []Patient has plateaued and is no longer responding to skilled PT intervention    []Patient is getting worse and would benefit from return to referring MD   []Patient unable to adhere to initial POC   [x]Other: Pt reports feeling about 75% improvement since starting PT. Pt states that she still has L sided neck pain, but trying to be more conscious of neck positioning when she is doing things, especially when working out in the yard. Pt states that the intensity and frequency of headaches has improved since starting PT. Pt states that she has not been able to walk as much around the neighborhood because this causes increased L sided neck pain.      Physical Therapy Treatment Note/ Progress Report:     Date:  2021    Patient Name:  Mark Sanchez    :  1940  MRN: [id number]  Medical/Treatment Diagnosis Information:  · Diagnosis: Chronic neck pain (M65.2, G89.29)  · Treatment Diagnosis: Chronic neck pain  Insurance/Certification information:  PT Insurance Information: [employer] - $532 deductible (met), $147 OOP max (met), $0 co-pay, 100% co-insurance, PT visits based on MN - requires auth through [facility]  Physician Information:  Referring Practitioner: Dr. Yesenia Drake of care signed (Y/N):     Date of Patient follow up with Physician: prn     Progress Report: [x]  Yes  []  No     Functional Scale: NDI = 18%   Date: 21    Date Range for reporting period:  Beginnin21  Endin21    Progress report due (10 Rx/or 30 days whichever is less): 68    Recertification due (POC duration/ or 90 days whichever is less): 21    Visit # Insurance Allowable Auth Needed    8 visits approved  -  [x]Yes    []No     Pain level:  5/10 L side of neck    SUBJECTIVE:  Pt reports feeling about 75% improvement since starting PT. Pt states that she still has L sided neck pain, but trying to be more conscious of neck positioning when she is doing things, especially when working out in the yard. Pt states that the intensity and frequency of headaches has improved since starting PT. Pt states that she has not been able to walk as much around the neighborhood because this causes increased L sided neck pain.       OBJECTIVE: See eval   Observation:    Test measurements:     21:   Cervical AROM: flex = 30, ext = 30, SB = 20 B, rot = 34 R, 30 L   21:  CERV ROM        Cervical Flexion 45     Cervical Extension 40       Left Right   Cervical SB 35 *pn 30   Cervical rotation 34 *pn 50      NDI = 18% disability    RESTRICTIONS/PRECAUTIONS: hx of C3-4 ACDF , hx of neck sx  (pt unsure, but thinks C6-7 and possibly discectomy?)    Exercises/Interventions:   Therapeutic Ex 20' Wt / Resistance Sets/sec Reps Notes          Seated cervical retraction  HEP   Scap retraction  HEP   Upper trap stretch  L only  HEP   Levator scap stretch  L only  HEP   TB rows yellow 1 10 Cues to decrease UT compensation TB ext yellow 1 10 Cues for appropriate posture   No monies at wall No resistance 1 10 Cues to decrease UT compensation   Corner pec stretch  10\" 5x Cues to decrease UT compensation   S/L open books w/foam roller under top leg     Cervical rotation AROM with chin tuck  1 10                                                        Therapeutic Activities                                                                      Manual Intervention 25'    **Pt requires 3-4 pillows when lying supine for comfort**   Shld /GH Mobs       Post Cap mobs       Suboccipital release 5'      CT MT/Mobs 5'      5'   C5-7 CPA mobs in supine  Gr I-II  C5 sideglides L to R   STM 8'   B cervical paraspinals, B suboccipitals, L UT   MWM L cervical rotation   1 5x           NMR re-education                                                                   Therapeutic Exercise and NMR EXR  [x] (96050) Provided verbal/tactile cueing for activities related to strengthening, flexibility, endurance, ROM  for improvements in scapular, scapulothoracic and UE control with self care, reaching, carrying, lifting, house/yardwork, driving/computer work.    [] (74719) Provided verbal/tactile cueing for activities related to improving balance, coordination, kinesthetic sense, posture, motor skill, proprioception  to assist with  scapular, scapulothoracic and UE control with self care, reaching, carrying, lifting, house/yardwork, driving/computer work. Therapeutic Activities:    [] (25002 or 49459) Provided verbal/tactile cueing for activities related to improving balance, coordination, kinesthetic sense, posture, motor skill, proprioception and motor activation to allow for proper function of scapular, scapulothoracic and UE control with self care, carrying, lifting, driving/computer work.      Home Exercise Program:    [x] (82120) Reviewed/Progressed HEP activities related to strengthening, flexibility, endurance, ROM of scapular, scapulothoracic and UE control with self care, reaching, carrying, lifting, house/yardwork, driving/computer work  [] (72648) Reviewed/Progressed HEP activities related to improving balance, coordination, kinesthetic sense, posture, motor skill, proprioception of scapular, scapulothoracic and UE control with self care, reaching, carrying, lifting, house/yardwork, driving/computer work      Manual Treatments:  PROM / STM / Oscillations-Mobs:  G-I, II, III, IV (PA's, Inf., Post.)  [x] (73337) Provided manual therapy to mobilize soft tissue/joints of cervical/CT, scapular GHJ and UE for the purpose of modulating pain, promoting relaxation,  increasing ROM, reducing/eliminating soft tissue swelling/inflammation/restriction, improving soft tissue extensibility and allowing for proper ROM for normal function with self care, reaching, carrying, lifting, house/yardwork, driving/computer work    Modalities: Declined - pt will use heat at home    Charges:  Timed Code Treatment Minutes: 45   Total Treatment Minutes: 45       [] EVAL (LOW) 34927 (typically 20 minutes face-to-face)  [] EVAL (MOD) 27920 (typically 30 minutes face-to-face)  [] EVAL (HIGH) 423 [id number] (typically 45 minutes face-to-face)  [] RE-EVAL     [x] AK(64584) x1     [] IONTO (01189)  [] NMR (51251) x     [] VASO (20710)  [x] Manual ([id number]) x 2    [] Other:  [] TA (28292)x     [] Mech Traction (56749)  [] ES(attended) (39592)     [] ES (un) (95777): If BWC Please Indicate Time In/Out and Total Minutes  CPT Code Time in Time out Total Min                                              GOALS:  Patient stated goal: \"To have more days that are painfree\"  [x]? Progressing: []? Met: []? Not Met: []? Adjusted     Therapist goals for Patient:   Short Term Goals: To be achieved in: 2 weeks  1. Independent in HEP and progression per patient tolerance, in order to prevent re-injury. [x]? Progressing: []? Met: []? Not Met: []? Adjusted     2.  Patient will have a decrease in pain to facilitate return to PLOF with decreased pain and difficulty. Return to Play: (if applicable)   []  Stage 1: Intro to Strength   []  Stage 2: Dynamic Strength and Intro to Plyometrics   []  Stage 3: Advanced Plyometrics and Intro to Throwing   []  Stage 4: Sport specific Training/Return to Sport     []  Ready to Return to Play, [employer]   []  Not Ready for Return to Sports   Comments:      Treatment/Activity Tolerance:  [x] Patient tolerated treatment well [] Patient limited by fatique  [] Patient limited by pain  [] Patient limited by other medical complications  [] Other:     Overall Progression Towards Functional goals/ Treatment Progress Update:  [x] Patient is progressing as expected towards functional goals listed. [] Progression is slowed due to complexities/Impairments listed. [] Progression has been slowed due to co-morbidities. [] Plan just implemented, too soon to assess goals progression <30days   [] Goals require adjustment due to lack of progress  [] Patient is not progressing as expected and requires additional follow up with physician  [] Other    Prognosis for POC: [] Good [x] Fair  [] Poor    Patient requires continued skilled intervention: [x] Yes  [] No      PLAN: Decrease neck pain and headaches. Improve scapular strength and overall awareness of posture/appropriate periscapular muscular activation. [x] Continue per plan of care [] Alter current plan (see comments)  [] Plan of care initiated [] Hold pending MD visit [] Discharge    Electronically signed by: Koki Kirkpatrick PT     Note: If patient does not return for scheduled/recommended follow up visits, this note will serve as a discharge from care along with the most recent update on progress.

## 2021-07-20 ENCOUNTER — HOSPITAL ENCOUNTER (OUTPATIENT)
Dept: PHYSICAL THERAPY | Age: 81
Setting detail: THERAPIES SERIES
Discharge: HOME | End: 2021-07-20
Payer: MEDICARE

## 2021-07-20 PROCEDURE — 97110 THERAPEUTIC EXERCISES: CPT

## 2021-07-20 PROCEDURE — 97140 MANUAL THERAPY 1/> REGIONS: CPT

## 2021-07-20 NOTE — FLOWSHEET NOTE
Physical Therapy Treatment Note/ Progress Report:     Date:  2021    Patient Name:  Michelle Frank    :  1940  MRN: [id number]  Medical/Treatment Diagnosis Information:  · Diagnosis: Chronic neck pain (M65.2, G89.29)  · Treatment Diagnosis: Chronic neck pain  Insurance/Certification information:  PT Insurance Information: [employer] - $147 deductible (met), $147 OOP max (met), $0 co-pay, 100% co-insurance, PT visits based on MN - [facility] auth [facility]  Physician Information:  Referring Practitioner: Dr. Molina Leader of care signed (Y/N):     Date of Patient follow up with Physician: prn     Progress Report: [x]  Yes  []  No     Functional Scale: NDI = 18%   Date: 21    Date Range for reporting period:  Beginnin21  Endin21    Progress report due (10 Rx/or 30 days whichever is less):     Recertification due (POC duration/ or 90 days whichever is less): 21    Visit # Insurance Allowable Auth Needed    8 visits approved  -  [x]Yes    []No     Pain level:  4/10 L side of neck    SUBJECTIVE:   : patient states that today she is doing ok today but yesterday was a bad day. Notes she had HAs again which were bad. Notes she is stressed about having CA removed from her leg by dermatologist today which may be why she is hurting more.       OBJECTIVE:   Observation:    Test measurements:     21:   Cervical AROM: flex = 30, ext = 30, SB = 20 B, rot = 34 R, 30 L   21:  CERV ROM        Cervical Flexion 45     Cervical Extension 40       Left Right   Cervical SB 35 *pn 30   Cervical rotation 34 *pn 50      NDI = 18% disability    RESTRICTIONS/PRECAUTIONS: hx of C3-4 ACDF , hx of neck sx  (pt unsure, but thinks C6-7 and possibly discectomy?)    Exercises/Interventions:   Therapeutic Ex (67098) Mins: 20' Reps/Resistance Notes        TB rows        Ext        B ER Yellow 1x10  Yellow 1x10  Yellow 1x10 Mod cues to decrease UT compensation and activate retractors   No monies at wall 1x10 Cues to decrease UT compensation   Pec doorway stretch  5x 10 sec holds Cues to decrease UT compensation   Cervical rotation AROM with chin tuck 10x L/R               Therapeutic Activities (44633) Mins:                     Manual Intervention (61351) Mins: 25'  **Pt requires 3-4 pillows when lying supine for comfort**   Suboccipital release x5 mins    CT MT/Mobs x5 mins    x5 mins C5-7 CPA mobs in supine  Gr I-II  C5 sideglides L to R   STM B cervical paraspinals, B suboccipitals, L UT x8 mins    MWM L cervical rotation  5x         NMR re-education (22986) Min:                     Modalities  Min:                    Therapeutic Exercise and NMR EXR  [x] (58072) Provided verbal/tactile cueing for activities related to strengthening, flexibility, endurance, ROM  for improvements in scapular, scapulothoracic and UE control with self care, reaching, carrying, lifting, house/yardwork, driving/computer work.    [] (40440) Provided verbal/tactile cueing for activities related to improving balance, coordination, kinesthetic sense, posture, motor skill, proprioception  to assist with  scapular, scapulothoracic and UE control with self care, reaching, carrying, lifting, house/yardwork, driving/computer work. Therapeutic Activities:    [] (64647 or 09584) Provided verbal/tactile cueing for activities related to improving balance, coordination, kinesthetic sense, posture, motor skill, proprioception and motor activation to allow for proper function of scapular, scapulothoracic and UE control with self care, carrying, lifting, driving/computer work.      Home Exercise Program:    [x] (32648) Reviewed/Progressed HEP activities related to strengthening, flexibility, endurance, ROM of scapular, scapulothoracic and UE control with self care, reaching, carrying, lifting, house/yardwork, driving/computer work  [] (05144) Reviewed/Progressed HEP activities related to improving balance, coordination, kinesthetic sense, posture, motor skill, proprioception of scapular, scapulothoracic and UE control with self care, reaching, carrying, lifting, house/yardwork, driving/computer work      Manual Treatments:  PROM / STM / Oscillations-Mobs:  G-I, II, III, IV (PA's, Inf., Post.)  [x] (88771) Provided manual therapy to mobilize soft tissue/joints of cervical/CT, scapular GHJ and UE for the purpose of modulating pain, promoting relaxation,  increasing ROM, reducing/eliminating soft tissue swelling/inflammation/restriction, improving soft tissue extensibility and allowing for proper ROM for normal function with self care, reaching, carrying, lifting, house/yardwork, driving/computer work    Modalities: Declined - pt will use heat at home    Charges:  Timed Code Treatment Minutes: 45   Total Treatment Minutes: 45       [] EVAL (LOW) 03278 (typically 20 minutes face-to-face)  [] EVAL (MOD) 29238 (typically 30 minutes face-to-face)  [] EVAL (HIGH) 62768 (typically 45 minutes face-to-face)  [] RE-EVAL     [x] VT(25587) x1     [] IONTO (87191)  [] NMR (77349) x     [] VASO (50911)  [x] Manual (89632) x 2    [] Other:  [] TA (67608)x     [] Mech Traction (65793)  [] ES(attended) (41386)     [] ES (un) (25493):     GOALS:  Patient stated goal: \"To have more days that are painfree\"  [x]? Progressing: []? Met: []? Not Met: []? Adjusted     Therapist goals for Patient:   Short Term Goals: To be achieved in: 2 weeks  1. Independent in HEP and progression per patient tolerance, in order to prevent re-injury. [x]? Progressing: []? Met: []? Not Met: []? Adjusted     2. Patient will have a decrease in pain to facilitate improvement in movement, function, and ADLs as indicated by Functional Deficits. [x]? Progressing: []? Met: []? Not Met: []? Adjusted     Long Term Goals: To be achieved in: 4 weeks  1. Disability index score of 10% or less for the NDI to assist with reaching prior level of function. []? Progressing: [x]? Met: []? Not Met: [x]? Adjusted  2. Patient will demonstrate increased AROM to [facility] of cervical/thoracic spine to allow for proper joint functioning as indicated by patients Functional Deficits. [x]? Progressing: []? Met: []? Not Met: []? Adjusted  3. Patient will demonstrate an increase in postural awareness and control and activation of  Deep cervical stabilizers to allow for proper functional mobility as indicated by patients Functional Deficits. [x]? Progressing: []? Met: []? Not Met: []? Adjusted  4. Patient will return to turning her head in order to drive without increased symptoms or restriction. [x]? Progressing: []? Met: []? Not Met: []? Adjusted   5. Patient will be able to walk for 30 minutes without increased symptoms or restriction. [x]? Progressing: []? Met: []? Not Met: []? Adjusted         Progression Towards Functional goals:  [x] Patient is progressing as expected towards functional goals listed. [] Progression is slowed due to complexities listed. [] Progression has been slowed due to co-morbidities. [] Plan just implemented, too soon to assess goals progression  [] Other:     ASSESSMENT:  Pt continues to respond well to manual therapy, especially STM to cervical paraspinals and suboccipitals and with cervical sideglides to C5. Pt demonstrates improved cervical AROM since starting PT and decreased frequency and intensity of HA shows progress towards LTG's. Pt continues to require significant verbal and tactile cues for appropriate periscapular activation and to decrease UT compensation. Pt requires further skilled PT services to address scapular strength deficits in order to return to PLOF with decreased pain and difficulty.       Treatment/Activity Tolerance:  [x] Patient tolerated treatment well [] Patient limited by fatique  [] Patient limited by pain  [] Patient limited by other medical complications  [] Other:     Overall Progression Towards Functional goals/ Treatment Progress Update:  [x] Patient is progressing as expected towards functional goals listed. [] Progression is slowed due to complexities/Impairments listed. [] Progression has been slowed due to co-morbidities. [] Plan just implemented, too soon to assess goals progression <30days   [] Goals require adjustment due to lack of progress  [] Patient is not progressing as expected and requires additional follow up with physician  [] Other    Prognosis for POC: [] Good [x] Fair  [] Poor    Patient requires continued skilled intervention: [x] Yes  [] No      PLAN: Decrease neck pain and headaches. Improve scapular strength and overall awareness of posture/appropriate periscapular muscular activation. [x] Continue per plan of care [] Alter current plan (see comments)  [] Plan of care initiated [] Hold pending MD visit [] Discharge    Electronically signed by: Mallory Steiner PT , OMT-C,  [id number]      Note: If patient does not return for scheduled/recommended follow up visits, this note will serve as a discharge from care along with the most recent update on progress.

## 2021-07-21 ENCOUNTER — HOSPITAL ENCOUNTER (OUTPATIENT)
Dept: PHYSICAL THERAPY | Age: 81
Setting detail: THERAPIES SERIES
Discharge: HOME | End: 2021-07-21
Payer: MEDICARE

## 2021-07-21 PROCEDURE — 97140 MANUAL THERAPY 1/> REGIONS: CPT

## 2021-07-21 PROCEDURE — 97530 THERAPEUTIC ACTIVITIES: CPT

## 2021-07-21 PROCEDURE — 97110 THERAPEUTIC EXERCISES: CPT

## 2021-07-21 NOTE — PLAN OF CARE
[address], [address]  [address] [place] [address]  Phone: [phone]   Fax:     [phone]    Physical Therapy Prescription    Date: 2021    Patient Name: Chely Rock  : 1940  MRN: [id number]    Diagnosis: Chronic Neck Pain  Treatment Diagnosis: decreased mobility, flexibility, strength  Referring Physician:  Enmanuel Rogers MD    With your approval we would like to add the following to the patient's current PT treatment:    [] Aquatic Exercise    [] TENS Unit        [] [facility] Cervical Traction Unit        [] [facility] Lumbar Traction Unit    [] Alexander aRe        [] Rolling/Standard Cosme Lento         [x]  Believe patient would be a good candidate for trial of dry needling to UT and Levator trigger points. Would like to add this to current treatment plan. If you are in agreement, please sign and return. Electronically signed by:  Tavon Winchester PT , OMT-C,  [id number]      If you have any questions or concerns, please don't hesitate to call.   Thank you for your referral.    Physician Signature:________________________________Date:__________________  By signing above, therapists plan is approved by physician

## 2021-07-21 NOTE — FLOWSHEET NOTE
Physical Therapy Treatment Note/ Progress Report:     Date:  2021    Patient Name:  Earnestine Deras    :  1940  MRN: [id number]    Medical/Treatment Diagnosis Information:  · Diagnosis: Chronic neck pain (M65.2, G89.29)  · Treatment Diagnosis: Chronic neck pain    Insurance/Certification information:  PT Insurance Information: [employer] - PT visits based on [place] - requires auth through [employer]    Physician Information:  Referring Practitioner: Dr. Jam Recinos of care signed (Y/N): sent  (received)    Date of Patient follow up with Physician: prn     Progress Report: []  Yes  [x]  No     Functional Scale: NDI = 18%   Date: 21    Date Range for reporting period:  Beginnin21  Endin21    Progress report due (10 Rx/or 30 days whichever is less): 36    Recertification due (POC duration/ or 90 days whichever is less): 21    Visit # Insurance Allowable Auth Needed      8 visits approved  -  [x]Yes    []No  Thru [facility]     Pain level:  4/10 L side of neck    SUBJECTIVE:   : patient states that today she is doing ok today but yesterday was a bad day. Notes she had HAs again which were bad. Notes she is stressed about having CA removed from her leg by dermatologist today which may be why she is hurting more. :  Normal soreness after last session which is relieved by heat. Notes not having a HA today.      OBJECTIVE:   Observation:    Test measurements:    ROM:  Date Cervical Flex Cervical Ext Cervical L SB Cervical R SB Cervical L Rot Cervical R Rot   21 30 30 20 20 30 34   21 45 40 35 *pn 30 34 *pn 48     Strength:  Date Shoulder flexion Shoulder abduction Shoulder IR Shoulder  ER Bicep                       RESTRICTIONS/PRECAUTIONS: hx of C3-4 ACDF , hx of C4-5, C5-6 ACDF     Exercises/Interventions:   Therapeutic Ex (17089) Mins: 20' Reps/Resistance Notes        TB rows        Ext        Shoulder ER Yellow 1x10  Yellow 1x10  Yellow 1x10 L/R Mod cues to decrease UT compensation and activate retractors   Bent over: Shoulder ext                  Horizontal abd 2# 1x10 L/R  add    No monies at wall 1x10 Cues to decrease UT compensation   Pec doorway stretch  5x 10 sec holds Cues to decrease UT compensation   Cervical rotation AROM with chin tuck 10x L/R               Therapeutic Activities (58773) Mins: 8     Discussed with patient posture with ADLs such as reading, lifting, cleaning. x8 mins Emphasis put on keeping UT relaxed with activities             Manual Intervention ([phone]) Mins: 25'  **Pt requires 3-4 pillows when lying supine for comfort**   Suboccipital release x5 mins    CT MT/Mobs x5 mins    x5 mins C5-7 CPA mobs in supine  Gr I-II  C6-7 sideglides L to R   STM B cervical paraspinals, B suboccipitals, L UT x8 mins    MWM L cervical rotation  5x         NMR re-education (26813) Min: 5     Supine chin nod 10x 3 sec holds painfree range             Modalities  Min:                    Therapeutic Exercise and NMR EXR  [x] (10178) Provided verbal/tactile cueing for activities related to strengthening, flexibility, endurance, ROM  for improvements in scapular, scapulothoracic and UE control with self care, reaching, carrying, lifting, house/yardwork, driving/computer work.    [] (57630) Provided verbal/tactile cueing for activities related to improving balance, coordination, kinesthetic sense, posture, motor skill, proprioception  to assist with  scapular, scapulothoracic and UE control with self care, reaching, carrying, lifting, house/yardwork, driving/computer work. Therapeutic Activities:    [] (91608 or 53120) Provided verbal/tactile cueing for activities related to improving balance, coordination, kinesthetic sense, posture, motor skill, proprioception and motor activation to allow for proper function of scapular, scapulothoracic and UE control with self care, carrying, lifting, driving/computer work. Home Exercise Program:    [x] (89773) Reviewed/Progressed HEP activities related to strengthening, flexibility, endurance, ROM of scapular, scapulothoracic and UE control with self care, reaching, carrying, lifting, house/yardwork, driving/computer work  [] (87331) Reviewed/Progressed HEP activities related to improving balance, coordination, kinesthetic sense, posture, motor skill, proprioception of scapular, scapulothoracic and UE control with self care, reaching, carrying, lifting, house/yardwork, driving/computer work      Manual Treatments:  PROM / STM / Oscillations-Mobs:  G-I, II, III, IV (PA's, Inf., Post.)  [x] (48845) Provided manual therapy to mobilize soft tissue/joints of cervical/CT, scapular GHJ and UE for the purpose of modulating pain, promoting relaxation,  increasing ROM, reducing/eliminating soft tissue swelling/inflammation/restriction, improving soft tissue extensibility and allowing for proper ROM for normal function with self care, reaching, carrying, lifting, house/yardwork, driving/computer work    Modalities: Declined - pt will use heat at home    Charges:  Timed Code Treatment Minutes: 58   Total Treatment Minutes: 58       [] EVAL (LOW) 96140 (typically 20 minutes face-to-face)  [] EVAL (MOD) 49501 (typically 30 minutes face-to-face)  [] EVAL (HIGH) 04228 (typically 45 minutes face-to-face)  [] RE-EVAL     [x] XC(49206) x1     [] IONTO (28662)  [] NMR (83132) x     [] VASO (58413)  [x] Manual (97640) x 2    [] Other:  [x] TA (04062)x     [] Mech Traction (33735)  [] ES(attended) (27577)     [] ES (un) (77078):     GOALS:  Patient stated goal: \"To have more days that are painfree\"  [x]? Progressing: []? Met: []? Not Met: []? Adjusted     Therapist goals for Patient:   Short Term Goals: To be achieved in: 2 weeks  1. Independent in HEP and progression per patient tolerance, in order to prevent re-injury. [x]? Progressing: []? Met: []? Not Met: []? Adjusted     2.  Patient will have a decrease in pain to facilitate improvement in movement, function, and ADLs as indicated by Functional Deficits. [x]? Progressing: []? Met: []? Not Met: []? Adjusted     Long Term Goals: To be achieved in: 4 weeks  1. Disability index score of 10% or less for the NDI to assist with reaching prior level of function. []? Progressing: [x]? Met: []? Not Met: [x]? Adjusted  2. Patient will demonstrate increased AROM to [facility] of cervical/thoracic spine to allow for proper joint functioning as indicated by patients Functional Deficits. [x]? Progressing: []? Met: []? Not Met: []? Adjusted  3. Patient will demonstrate an increase in postural awareness and control and activation of  Deep cervical stabilizers to allow for proper functional mobility as indicated by patients Functional Deficits. [x]? Progressing: []? Met: []? Not Met: []? Adjusted  4. Patient will return to turning her head in order to drive without increased symptoms or restriction. [x]? Progressing: []? Met: []? Not Met: []? Adjusted   5. Patient will be able to walk for 30 minutes without increased symptoms or restriction. [x]? Progressing: []? Met: []? Not Met: []? Adjusted         Progression Towards Functional goals:  [x] Patient is progressing as expected towards functional goals listed. [] Progression is slowed due to complexities listed. [] Progression has been slowed due to co-morbidities. [] Plan just implemented, too soon to assess goals progression  [] Other:     ASSESSMENT:  Pt continues to respond well to manual therapy, especially STM to cervical paraspinals and suboccipitals. Pt demonstrates improved cervical AROM following manual therapy. Pt continues to require significant verbal and tactile cues for appropriate periscapular activation and to decrease UT compensation. Good tolerance to gradual increase in periscapular strengthening.      Treatment/Activity Tolerance:  [x] Patient tolerated treatment well [] Patient limited by

## 2021-07-23 ENCOUNTER — APPOINTMENT (OUTPATIENT)
Dept: PHYSICAL THERAPY | Age: 81
End: 2021-07-23
Payer: MEDICARE

## 2021-07-27 ENCOUNTER — HOSPITAL ENCOUNTER (OUTPATIENT)
Dept: PHYSICAL THERAPY | Age: 81
Setting detail: THERAPIES SERIES
Discharge: HOME | End: 2021-07-27
Payer: MEDICARE

## 2021-07-27 PROCEDURE — 20560 NDL INSJ W/O NJX 1 OR 2 MUSC: CPT

## 2021-07-27 PROCEDURE — 97110 THERAPEUTIC EXERCISES: CPT

## 2021-07-27 PROCEDURE — 97140 MANUAL THERAPY 1/> REGIONS: CPT

## 2021-07-27 NOTE — FLOWSHEET NOTE
Physical Therapy Treatment Note/ Progress Report:     Date:  2021    Patient Name:  Kathy Guidry    :  1940  MRN: [id number]    Medical/Treatment Diagnosis Information:  · Diagnosis: Chronic neck pain (M65.2, G89.29)  · Treatment Diagnosis: Chronic neck pain    Insurance/Certification information:  PT Insurance Information: [employer] - PT visits based on [place] - requires auth through [employer]    Physician Information:  Referring Practitioner: Dr. Jake Soto of care signed (Y/N): sent  (received)    Date of Patient follow up with Physician: prn     Progress Report: []  Yes  [x]  No     Functional Scale: NDI = 18%   Date: 21    Date Range for reporting period:  Beginnin21  Endin21    Progress report due (10 Rx/or 30 days whichever is less):     Recertification due (POC duration/ or 90 days whichever is less): 21    Visit # Insurance Allowable Auth Needed      8 visits approved  -  [x]Yes    []No  Thru [facility]     Pain level:  4/10 L side of neck    SUBJECTIVE:   : patient states that today she is doing ok today but yesterday was a bad day. Notes she had HAs again which were bad. Notes she is stressed about having CA removed from her leg by dermatologist today which may be why she is hurting more. :  Normal soreness after last session which is relieved by heat. Notes not having a HA today. :  Notes she had a few bad days but after those she has been feeling a little better. Notes during this time her  was having some bad days which had her stressed. Is interested in trying dry needling. Past few days she has been able to move her head better.      OBJECTIVE:   Observation:    Test measurements:    ROM:  Date Cervical Flex Cervical Ext Cervical L SB Cervical R SB Cervical L Rot Cervical R Rot   21 30 30 20 20 30 34   21 45 40 35 *pn 30 34 *pn 48     Strength:  Date Shoulder flexion Shoulder abduction Shoulder IR Shoulder  ER Bicep                       RESTRICTIONS/PRECAUTIONS: hx of C3-4 ACDF 2018, hx of C4-5, C5-6 ACDF 2005    Exercises/Interventions:   Therapeutic Ex (02222) Mins: 15 Reps/Resistance Notes        TB rows        Ext        Shoulder ER Yellow 1x10  Yellow 1x10  Yellow 1x10 L/R Mod cues to decrease UT compensation and activate retractors   Bent over: Shoulder ext                  Horizontal abd 2# 1x10 L/R  2# 1x10 L/R    No monies at wall 1x10 Cues to decrease UT compensation   Pec doorway stretch  5x 10 sec holds Cues to decrease UT compensation   Cervical rotation AROM with chin tuck 10x L/R               Therapeutic Activities (07814) Mins:                     Manual Intervention (90941) Mins: 38  **Pt requires 3-4 pillows when lying supine for comfort**   Suboccipital release x5 mins    CT MT/Mobs x5 mins    x5 mins C5-7 CPA mobs in supine  Gr I-II  C6-7 sideglides L to R   STM B cervical paraspinals, B suboccipitals, L UT x8 mins    MWM L cervical rotation  5x    Dry Needling to L UT x10 mins  See below for details         NMR re-education (43685) Min: 5     Supine chin nod 10x 3 sec holds painfree range             Modalities  Min:                Spoke with   regarding the use of Dry Needling     Dry needling manual therapy: consisted on the placement of 1 needle in the following muscles:  L UT.  A 40 mm needle was inserted, piston, rotated, and coned to produce intramuscular mobilization. These techniques were used to restore functional range of motion, reduce muscle spasm and induce healing in the corresponding musculature. (21033)  Clean Technique was utilized today while applying Dry needling treatment. The treatment sites where cleaned with 70% solution of  isopropyl alcohol . The PT washed their hands and utilized treatment gloves along with hand  prior to inserting the needles. All needles where removed and discarded in the appropriate sharps container.   MD has given verbal and/or written approval for this treatment. Therapeutic Exercise and NMR EXR  [x] (84708) Provided verbal/tactile cueing for activities related to strengthening, flexibility, endurance, ROM  for improvements in scapular, scapulothoracic and UE control with self care, reaching, carrying, lifting, house/yardwork, driving/computer work.    [] (73101) Provided verbal/tactile cueing for activities related to improving balance, coordination, kinesthetic sense, posture, motor skill, proprioception  to assist with  scapular, scapulothoracic and UE control with self care, reaching, carrying, lifting, house/yardwork, driving/computer work. Therapeutic Activities:    [] (78405 or 98712) Provided verbal/tactile cueing for activities related to improving balance, coordination, kinesthetic sense, posture, motor skill, proprioception and motor activation to allow for proper function of scapular, scapulothoracic and UE control with self care, carrying, lifting, driving/computer work.      Home Exercise Program:    [x] (88237) Reviewed/Progressed HEP activities related to strengthening, flexibility, endurance, ROM of scapular, scapulothoracic and UE control with self care, reaching, carrying, lifting, house/yardwork, driving/computer work  [] (09023) Reviewed/Progressed HEP activities related to improving balance, coordination, kinesthetic sense, posture, motor skill, proprioception of scapular, scapulothoracic and UE control with self care, reaching, carrying, lifting, house/yardwork, driving/computer work      Manual Treatments:  PROM / STM / Oscillations-Mobs:  G-I, II, III, IV (PA's, Inf., Post.)  [x] (31719) Provided manual therapy to mobilize soft tissue/joints of cervical/CT, scapular GHJ and UE for the purpose of modulating pain, promoting relaxation,  increasing ROM, reducing/eliminating soft tissue swelling/inflammation/restriction, improving soft tissue extensibility and allowing for proper ROM for normal function with self care, reaching, carrying, lifting, house/yardwork, driving/computer work      Charges:  Timed Code Treatment Minutes: 58   Total Treatment Minutes: 58       [] EVAL (LOW) 34882 (typically 20 minutes face-to-face)  [] EVAL (MOD) 20714 (typically 30 minutes face-to-face)  [] EVAL (HIGH) 03933 (typically 45 minutes face-to-face)  [] RE-EVAL     [x] UR(55146) x1     [] IONTO (71667)  [] NMR (89179) x     [] VASO (48213)  [x] Manual (49510) x 2    [] Other:  [] TA (66659)x     [] Mech Traction (53680)  [] ES(attended) (93625)     [] ES (un) (12423):   [x] Dry needle 1 or 2 Muscles (22721)  [] Dry needle 3+ Muscles (08740)    GOALS:  Patient stated goal: \"To have more days that are painfree\"  [x]? Progressing: []? Met: []? Not Met: []? Adjusted     Therapist goals for Patient:   Short Term Goals: To be achieved in: 2 weeks  1. Independent in HEP and progression per patient tolerance, in order to prevent re-injury. [x]? Progressing: []? Met: []? Not Met: []? Adjusted     2. Patient will have a decrease in pain to facilitate improvement in movement, function, and ADLs as indicated by Functional Deficits. [x]? Progressing: []? Met: []? Not Met: []? Adjusted     Long Term Goals: To be achieved in: 4 weeks  1. Disability index score of 10% or less for the NDI to assist with reaching prior level of function. []? Progressing: [x]? Met: []? Not Met: [x]? Adjusted  2. Patient will demonstrate increased AROM to [facility] of cervical/thoracic spine to allow for proper joint functioning as indicated by patients Functional Deficits. [x]? Progressing: []? Met: []? Not Met: []? Adjusted  3. Patient will demonstrate an increase in postural awareness and control and activation of  Deep cervical stabilizers to allow for proper functional mobility as indicated by patients Functional Deficits. [x]? Progressing: []? Met: []? Not Met: []? Adjusted  4.  Patient will return to turning her head in order to drive without increased symptoms or restriction. [x]? Progressing: []? Met: []? Not Met: []? Adjusted   5. Patient will be able to walk for 30 minutes without increased symptoms or restriction. [x]? Progressing: []? Met: []? Not Met: []? Adjusted         Progression Towards Functional goals:  [x] Patient is progressing as expected towards functional goals listed. [] Progression is slowed due to complexities listed. [] Progression has been slowed due to co-morbidities. [] Plan just implemented, too soon to assess goals progression  [] Other:     ASSESSMENT:  Pt continues to respond well to manual therapy, especially STM to cervical paraspinals and suboccipitals. Added dry needling to L UT. Received significant twitch response with needling. Pt demonstrates improved cervical AROM following manual therapy. Pt continues to require significant verbal and tactile cues for appropriate periscapular activation and to decrease UT compensation. Good tolerance to gradual increase in periscapular strengthening. Treatment/Activity Tolerance:  [x] Patient tolerated treatment well [] Patient limited by fatique  [] Patient limited by pain  [] Patient limited by other medical complications  [] Other:     Overall Progression Towards Functional goals/ Treatment Progress Update:  [x] Patient is progressing as expected towards functional goals listed. [] Progression is slowed due to complexities/Impairments listed. [] Progression has been slowed due to co-morbidities.   [] Plan just implemented, too soon to assess goals progression <30days   [] Goals require adjustment due to lack of progress  [] Patient is not progressing as expected and requires additional follow up with physician  [] Other    Prognosis for POC: [] Good [x] Fair  [] Poor    Patient requires continued skilled intervention: [x] Yes  [] No    PLAN: Assess dry needling tolerance  [x] Continue per plan of care [] Alter current plan (see comments)  [] Plan of care initiated [] Hold pending MD visit [] Discharge    Electronically signed by: Hayden Zhou PT , OMT-C,  [id number]      Note: If patient does not return for scheduled/recommended follow up visits, this note will serve as a discharge from care along with the most recent update on progress.

## 2021-07-30 ENCOUNTER — HOSPITAL ENCOUNTER (OUTPATIENT)
Dept: PHYSICAL THERAPY | Age: 81
Setting detail: THERAPIES SERIES
Discharge: HOME | End: 2021-07-30
Payer: MEDICARE

## 2021-07-30 PROCEDURE — 97110 THERAPEUTIC EXERCISES: CPT

## 2021-07-30 PROCEDURE — 20560 NDL INSJ W/O NJX 1 OR 2 MUSC: CPT

## 2021-07-30 PROCEDURE — 97140 MANUAL THERAPY 1/> REGIONS: CPT

## 2021-07-30 NOTE — PLAN OF CARE
[facility] signed (Y/N): sent  (received)    Date of Patient follow up with Physician: prn     Progress Report: []  Yes  [x]  No     Functional Scale: NDI = 24%   Date: 21    Date Range for reporting period:  Beginnin21  Endin21    Progress report due (10 Rx/or 30 days whichever is less):     Recertification due (POC duration/ or 90 days whichever is less): 21    Visit # Insurance Allowable Auth Needed      8 visits approved  -  [x]Yes    []No  Thru cohere     Pain level:  4/10 L side of neck    SUBJECTIVE:   : patient states that today she is doing ok today but yesterday was a bad day. Notes she had HAs again which were bad. Notes she is stressed about having CA removed from her leg by dermatologist today which may be why she is hurting more. :  Normal soreness after last session which is relieved by heat. Notes not having a HA today. :  Notes she had a few bad days but after those she has been feeling a little better. Notes during this time her  was having some bad days which had her stressed. Is interested in trying dry needling. Past few days she has been able to move her head better. : patient notes she was sore after the needling but once the soreness backed off she could move her head better. Patient reports 50% improvement overall with pain. Occasionally still getting headaches. Currently tolerating about 20 mins of walking before neck pain increases. Patient states having good days and bad days still. Able to turn her head with driving now.      OBJECTIVE: Updated 21   Observation:    Test measurements:    ROM:  Date Cervical Flex Cervical Ext Cervical L SB Cervical R SB Cervical L Rot Cervical R Rot   21 30 30 20 20 30 34   21 45 40 35 *pn 30 34 *pn 48   21 45 40 30 30 35 50     Strength:  Date Shoulder flexion Shoulder abduction Shoulder IR Shoulder  ER Bicep   21 4/5 4/5 4/5 4/5 4/5       RESTRICTIONS/PRECAUTIONS: hx of C3-4 ACDF 2018, hx of C4-5, C5-6 ACDF 2005    Exercises/Interventions:   Therapeutic Ex (96773) Mins: 15 Reps/Resistance Notes        TB rows        Ext        Shoulder ER Yellow 1x10  Yellow 1x10  Yellow 1x10 L/R Mod cues to decrease UT compensation and activate retractors   Bent over: Shoulder ext                  Horizontal abd 2# 1x10 L/R  2# 1x10 L/R    No monies at wall 1x10 Cues to decrease UT compensation   Pec doorway stretch  5x 10 sec holds Cues to decrease UT compensation   Cervical rotation AROM with chin tuck 10x L/R               Therapeutic Activities (70586) Mins:                     Manual Intervention (62855) Mins: 38  **Pt requires 3-4 pillows when lying supine for comfort**   Suboccipital release x5 mins    CT MT/Mobs x5 mins    x5 mins C5-7 CPA mobs in supine  Gr I-II  C6-7 sideglides L to R   STM B cervical paraspinals, B suboccipitals, L UT x8 mins    MWM L cervical rotation  5x    Dry Needling to L UT x10 mins  See below for details         NMR re-education (81853) Min: 5     Supine chin nod 10x 3 sec holds painfree range             Modalities  Min:                Spoke with   regarding the use of Dry Needling     Dry needling manual therapy: consisted on the placement of 1 needle in the following muscles:  L UT.  A 40 mm needle was inserted, piston, rotated, and coned to produce intramuscular mobilization. These techniques were used to restore functional range of motion, reduce muscle spasm and induce healing in the corresponding musculature. (84617)  Clean Technique was utilized today while applying Dry needling treatment. The treatment sites where cleaned with 70% solution of  isopropyl alcohol . The PT washed their hands and utilized treatment gloves along with hand  prior to inserting the needles. All needles where removed and discarded in the appropriate sharps container.   MD has given verbal and/or written approval for this treatment. Home Exercise Program: Patient issued updated HEP of the following: tband row/ext/ER, no monies, bent over row/ext, cerv rotation with chin tuck, pec stretch, and supine chin tuck. Patient verbalized/demonstrated understanding and was issued written handout. Patient also issued orange tband. Therapeutic Exercise and NMR EXR  [x] (76741) Provided verbal/tactile cueing for activities related to strengthening, flexibility, endurance, ROM  for improvements in scapular, scapulothoracic and UE control with self care, reaching, carrying, lifting, house/yardwork, driving/computer work.    [] (27892) Provided verbal/tactile cueing for activities related to improving balance, coordination, kinesthetic sense, posture, motor skill, proprioception  to assist with  scapular, scapulothoracic and UE control with self care, reaching, carrying, lifting, house/yardwork, driving/computer work. Therapeutic Activities:    [] (71832 or 68716) Provided verbal/tactile cueing for activities related to improving balance, coordination, kinesthetic sense, posture, motor skill, proprioception and motor activation to allow for proper function of scapular, scapulothoracic and UE control with self care, carrying, lifting, driving/computer work.      Home Exercise Program:    [x] (01211) Reviewed/Progressed HEP activities related to strengthening, flexibility, endurance, ROM of scapular, scapulothoracic and UE control with self care, reaching, carrying, lifting, house/yardwork, driving/computer work  [] (33768) Reviewed/Progressed HEP activities related to improving balance, coordination, kinesthetic sense, posture, motor skill, proprioception of scapular, scapulothoracic and UE control with self care, reaching, carrying, lifting, house/yardwork, driving/computer work      Manual Treatments:  PROM / STM / Oscillations-Mobs:  G-I, II, III, IV (PA's, Inf., Post.)  [x] (98283) Provided manual therapy to mobilize soft tissue/joints of cervical/CT, scapular GHJ and UE for the purpose of modulating pain, promoting relaxation,  increasing ROM, reducing/eliminating soft tissue swelling/inflammation/restriction, improving soft tissue extensibility and allowing for proper ROM for normal function with self care, reaching, carrying, lifting, house/yardwork, driving/computer work      Charges:  Timed Code Treatment Minutes: 58   Total Treatment Minutes: 58       [] EVAL (LOW) 89857 (typically 20 minutes face-to-face)  [] EVAL (MOD) 87024 (typically 30 minutes face-to-face)  [] EVAL (HIGH) 14300 (typically 45 minutes face-to-face)  [] RE-EVAL     [x] MI(74626) x1     [] IONTO (09120)  [] NMR (83216) x     [] VASO (00191)  [x] Manual (94616) x 2    [] Other:  [] TA (28315)x     [] Mech Traction (36270)  [] ES(attended) (08296)     [] ES (un) (08764):   [x] Dry needle 1 or 2 Muscles (35560)  [] Dry needle 3+ Muscles (85563)    GOALS:  Patient stated goal: \"To have more days that are painfree\"  [x]? Progressing: []? Met: []? Not Met: []? Adjusted     Therapist goals for Patient:   Short Term Goals: To be achieved in: 2 weeks  1. Independent in HEP and progression per patient tolerance, in order to prevent re-injury. []? Progressing: [x]? Met: []? Not Met: []? Adjusted     2. Patient will have a decrease in pain to facilitate improvement in movement, function, and ADLs as indicated by Functional Deficits. [x]? Progressing: []? Met: []? Not Met: []? Adjusted     Long Term Goals: To be achieved in: 4 weeks  1. Disability index score of 10% or less for the NDI to assist with reaching prior level of function. [x]? Progressing: []? Met: []? Not Met: []? Adjusted  2. Patient will demonstrate increased AROM to [facility] of cervical/thoracic spine to allow for proper joint functioning as indicated by patients Functional Deficits. [x]? Progressing: []? Met: []? Not Met: []? Adjusted  3.  Patient will demonstrate an increase in postural awareness and control and activation of  Deep cervical stabilizers to allow for proper functional mobility as indicated by patients Functional Deficits. [x]? Progressing: []? Met: []? Not Met: []? Adjusted  4. Patient will return to turning her head in order to drive without increased symptoms or restriction. []? Progressing: [x]? Met: []? Not Met: []? Adjusted   5. Patient will be able to walk for 30 minutes without increased symptoms or restriction. [x]? Progressing: []? Met: []? Not Met: []? Adjusted         Progression Towards Functional goals:  [x] Patient is progressing as expected towards functional goals listed. [] Progression is slowed due to complexities listed. [] Progression has been slowed due to co-morbidities. [] Plan just implemented, too soon to assess goals progression  [] Other:     ASSESSMENT:  Pt's progress in PT has reached a plateau. Pt responded well to dry needling and manual therapy. Slight increase in cervical flexibility noted after manual therapy. Cervical ROM still significant limited. Patient tolerates scap strengthening but needs mod cueing to decrease UT compensation. Patient may benefit from MRI due to continued c/o pain. Treatment/Activity Tolerance:  [x] Patient tolerated treatment well [] Patient limited by fatique  [] Patient limited by pain  [] Patient limited by other medical complications  [] Other:     Overall Progression Towards Functional goals/ Treatment Progress Update:  [x] Patient is progressing as expected towards functional goals listed. [] Progression is slowed due to complexities/Impairments listed. [] Progression has been slowed due to co-morbidities.   [] Plan just implemented, too soon to assess goals progression <30days   [] Goals require adjustment due to lack of progress  [] Patient is not progressing as expected and requires additional follow up with physician  [] Other    Prognosis for POC: [] Good [x] Fair  [] Poor    Patient requires continued skilled intervention: [] Yes  [x] No    PLAN: D/C with HEP  [] Continue per plan of care [] Alter current plan (see comments)  [] Plan of care initiated [] Hold pending MD visit [x] Discharge    Electronically signed by: Emma Melchor PT , OMT-C,  [id number]      Note: If patient does not return for scheduled/recommended follow up visits, this note will serve as a discharge from care along with the most recent update on progress.

## 2021-10-01 ENCOUNTER — HOSPITAL ENCOUNTER (OUTPATIENT)
Dept: MRI IMAGING | Age: 81
Discharge: HOME | End: 2021-10-01
Payer: MEDICARE

## 2021-10-01 DIAGNOSIS — M54.81 OCCIPITAL NEURALGIA OF LEFT SIDE: ICD-10-CM

## 2021-10-01 PROCEDURE — 70551 MRI BRAIN STEM W/O DYE: CPT

## 2021-10-04 ENCOUNTER — HOSPITAL ENCOUNTER (OUTPATIENT)
Dept: GENERAL RADIOLOGY | Age: 81
Discharge: HOME | End: 2021-10-04
Payer: MEDICARE

## 2021-10-04 ENCOUNTER — HOSPITAL ENCOUNTER (OUTPATIENT)
Age: 81
Discharge: HOME | End: 2021-10-04
Payer: MEDICARE

## 2021-10-04 DIAGNOSIS — M54.13 RADICULOPATHY OF CERVICOTHORACIC REGION: ICD-10-CM

## 2021-10-04 PROCEDURE — 72050 X-RAY EXAM NECK SPINE 4/5VWS: CPT

## 2023-03-02 ENCOUNTER — TELEPHONE (OUTPATIENT)
Dept: ADMINISTRATIVE | Age: 83
End: 2023-03-02

## 2023-07-03 ENCOUNTER — HOSPITAL ENCOUNTER (OUTPATIENT)
Dept: WOMENS IMAGING | Age: 83
Discharge: HOME | End: 2023-07-03
Attending: INTERNAL MEDICINE
Payer: MEDICARE

## 2023-07-03 DIAGNOSIS — Z78.0 MENOPAUSE: ICD-10-CM

## 2023-07-03 PROCEDURE — 77080 DXA BONE DENSITY AXIAL: CPT

## 2023-11-30 ENCOUNTER — HOSPITAL ENCOUNTER (OUTPATIENT)
Dept: CT IMAGING | Age: 83
Discharge: HOME | End: 2023-11-30
Attending: INTERNAL MEDICINE
Payer: MEDICARE

## 2023-11-30 DIAGNOSIS — R10.31 RLQ ABDOMINAL PAIN: ICD-10-CM

## 2023-11-30 PROCEDURE — 74177 CT ABD & PELVIS W/CONTRAST: CPT

## 2023-11-30 PROCEDURE — 6360000004 HC RX CONTRAST MEDICATION: Performed by: INTERNAL MEDICINE

## 2023-11-30 RX ADMIN — IOPAMIDOL 50 ML: 612 INJECTION, SOLUTION INTRAVENOUS at 17:14

## 2023-11-30 RX ADMIN — IOPAMIDOL 75 ML: 755 INJECTION, SOLUTION INTRAVENOUS at 17:14

## 2023-12-05 PROBLEM — N18.30 CHRONIC RENAL DISEASE, STAGE III (HCC): Status: ACTIVE | Noted: 2023-12-05

## 2024-01-05 NOTE — PROGRESS NOTES
[facility] ENDOSCOPY EGD PRE-OPERATIVE INSTRUCTIONS    Procedure date__1/12/2024_______  Arrival time__1145__________          Surgery time__1245__________       Nothing by mouth after midnight the night before the procedure.  This includes water chewing gum, mints and ice chips.   You may brush your teeth and gargle the morning of your surgery, but do not swallow the water    You may be asked to stop blood thinners such as Coumadin, Plavix, Fragmin, Lovenox, etc., or any anti-inflammatories such as:  Aspirin, Ibuprofen, Advil, Naproxen prior to your procedure.   We also ask that you stop any OTC medications such as fish oil, vitamin E, glucosamine, garlic, Multivitamins, COQ 10, etc.    You must make arrangements for a responsible adult to arrive with you and stay in our waiting area during your procedure.  They will also need to take you home after your procedure.   For your safety you will not be allowed to leave alone or drive yourself home.    Also for your safety, it is strongly suggested that someone stay with you the first 24 hours after your procedure.    For your comfort, please wear simple loose fitting clothing to the center.  Please do not bring valuables.      If you have a living will and a durable power of  for healthcare, please bring in a copy.    You will need to bring a photo ID and insurance card    Our goal is to provide you with excellent care so if you have any questions, please contact us at the [facility] at [phone]         Please note these are generalized instructions for all EGD cases, you may be provided with more specific instructions if necessary

## 2024-01-11 ENCOUNTER — ANESTHESIA EVENT (OUTPATIENT)
Dept: ENDOSCOPY | Age: 84
End: 2024-01-11
Payer: MEDICARE

## 2024-01-12 ENCOUNTER — ANESTHESIA (OUTPATIENT)
Dept: ENDOSCOPY | Age: 84
End: 2024-01-12
Payer: MEDICARE

## 2024-01-12 ENCOUNTER — HOSPITAL ENCOUNTER (OUTPATIENT)
Age: 84
Setting detail: OUTPATIENT SURGERY
Discharge: HOME | End: 2024-01-12
Attending: INTERNAL MEDICINE | Admitting: INTERNAL MEDICINE
Payer: MEDICARE

## 2024-01-12 VITALS
WEIGHT: 109 LBS | HEART RATE: 85 BPM | OXYGEN SATURATION: 97 % | RESPIRATION RATE: 16 BRPM | SYSTOLIC BLOOD PRESSURE: 120 MMHG | TEMPERATURE: 97.2 F | HEIGHT: 60 IN | BODY MASS INDEX: 21.4 KG/M2 | DIASTOLIC BLOOD PRESSURE: 71 MMHG

## 2024-01-12 PROCEDURE — 7100000010 HC PHASE II RECOVERY - FIRST 15 MIN: Performed by: INTERNAL MEDICINE

## 2024-01-12 PROCEDURE — 7100000011 HC PHASE II RECOVERY - ADDTL 15 MIN: Performed by: INTERNAL MEDICINE

## 2024-01-12 PROCEDURE — 3609017100 HC EGD: Performed by: INTERNAL MEDICINE

## 2024-01-12 PROCEDURE — 6360000002 HC RX W HCPCS: Performed by: NURSE ANESTHETIST, CERTIFIED REGISTERED

## 2024-01-12 PROCEDURE — 3700000000 HC ANESTHESIA ATTENDED CARE: Performed by: INTERNAL MEDICINE

## 2024-01-12 PROCEDURE — 2580000003 HC RX 258: Performed by: NURSE ANESTHETIST, CERTIFIED REGISTERED

## 2024-01-12 PROCEDURE — 2580000003 HC RX 258: Performed by: ANESTHESIOLOGY

## 2024-01-12 PROCEDURE — 2500000003 HC RX 250 WO HCPCS: Performed by: NURSE ANESTHETIST, CERTIFIED REGISTERED

## 2024-01-12 RX ORDER — SODIUM CHLORIDE 0.9 % (FLUSH) 0.9 %
5-40 SYRINGE (ML) INJECTION EVERY 12 HOURS SCHEDULED
Status: DISCONTINUED | OUTPATIENT
Start: 2024-01-12 | End: 2024-01-12 | Stop reason: HOSPADM

## 2024-01-12 RX ORDER — SODIUM CHLORIDE 0.9 % (FLUSH) 0.9 %
5-40 SYRINGE (ML) INJECTION PRN
Status: DISCONTINUED | OUTPATIENT
Start: 2024-01-12 | End: 2024-01-12 | Stop reason: HOSPADM

## 2024-01-12 RX ORDER — SODIUM CHLORIDE 9 MG/ML
INJECTION, SOLUTION INTRAVENOUS CONTINUOUS PRN
Status: DISCONTINUED | OUTPATIENT
Start: 2024-01-12 | End: 2024-01-12 | Stop reason: SDUPTHER

## 2024-01-12 RX ORDER — GLYCOPYRROLATE 0.2 MG/ML
INJECTION INTRAMUSCULAR; INTRAVENOUS PRN
Status: DISCONTINUED | OUTPATIENT
Start: 2024-01-12 | End: 2024-01-12 | Stop reason: SDUPTHER

## 2024-01-12 RX ORDER — SODIUM CHLORIDE 9 MG/ML
INJECTION, SOLUTION INTRAVENOUS PRN
Status: DISCONTINUED | OUTPATIENT
Start: 2024-01-12 | End: 2024-01-12 | Stop reason: HOSPADM

## 2024-01-12 RX ORDER — LIDOCAINE HYDROCHLORIDE 20 MG/ML
INJECTION, SOLUTION EPIDURAL; INFILTRATION; INTRACAUDAL; PERINEURAL PRN
Status: DISCONTINUED | OUTPATIENT
Start: 2024-01-12 | End: 2024-01-12 | Stop reason: SDUPTHER

## 2024-01-12 RX ORDER — PROPOFOL 10 MG/ML
INJECTION, EMULSION INTRAVENOUS PRN
Status: DISCONTINUED | OUTPATIENT
Start: 2024-01-12 | End: 2024-01-12 | Stop reason: SDUPTHER

## 2024-01-12 RX ADMIN — PROPOFOL 75 MG: 10 INJECTION, EMULSION INTRAVENOUS at 11:58

## 2024-01-12 RX ADMIN — GLYCOPYRROLATE 0.2 MG: 0.2 INJECTION INTRAMUSCULAR; INTRAVENOUS at 11:54

## 2024-01-12 RX ADMIN — PROPOFOL 50 MG: 10 INJECTION, EMULSION INTRAVENOUS at 12:05

## 2024-01-12 RX ADMIN — PROPOFOL 25 MG: 10 INJECTION, EMULSION INTRAVENOUS at 12:00

## 2024-01-12 RX ADMIN — SODIUM CHLORIDE: 9 INJECTION, SOLUTION INTRAVENOUS at 11:36

## 2024-01-12 RX ADMIN — SODIUM CHLORIDE: 9 INJECTION, SOLUTION INTRAVENOUS at 11:54

## 2024-01-12 RX ADMIN — PROPOFOL 50 MG: 10 INJECTION, EMULSION INTRAVENOUS at 12:03

## 2024-01-12 RX ADMIN — LIDOCAINE HYDROCHLORIDE 60 MG: 20 INJECTION, SOLUTION EPIDURAL; INFILTRATION; INTRACAUDAL; PERINEURAL at 11:58

## 2024-01-12 ASSESSMENT — PAIN - FUNCTIONAL ASSESSMENT
PAIN_FUNCTIONAL_ASSESSMENT: 0-10
PAIN_FUNCTIONAL_ASSESSMENT: 0-10
PAIN_FUNCTIONAL_ASSESSMENT: NONE - DENIES PAIN
PAIN_FUNCTIONAL_ASSESSMENT: 0-10

## 2024-01-12 NOTE — H&P
Gastroenterology Outpatient History and Physical     Patient: Ashwini Sánchez MRN: [id number] Sex: female   YOB: 1940 Age: 83 y.o. Location: [facility]    Date:1/12/2024  Primary Care Physician: Dragan Santana MD         Patient: Ashwini Sánchez    Physician: Ronny Hoskins MD    History of Present Illness: upper abdominal pain  Review of Systems:  Weight Loss: No  Dysphagia: No  Dyspepsia: No  History:  Past Medical History:   Diagnosis Date    ACL tear     not repaired-left leg    Back pain     Osteoarthritis       Past Surgical History:   Procedure Laterality Date    BREAST SURGERY Left 1996    Biopsy    BUNIONECTOMY Left 04/22/2019    LEFT FOOT BUNION CORRECTION, DISTAL CHEVRON AND DISTAL SOFT TISSUE RELEASE, LEFT FOOT SECOND HAMMERTOE CORRECTION WITH SMART TOE, WEIL OSTEOTOMY, ANGULAR DEFORMITY CORRECTION WITH MINI C-ARM performed by Mateo Hurt MD at [facility] OR    CARPAL TUNNEL RELEASE Right 04/04/2019    CATARACT REMOVAL Bilateral     CERVICAL SPINE SURGERY  2005    Anterior cervical Discectomy    CERVICAL SPINE SURGERY  03/14/2018    Isai-anterior cervical discectomy C3-4 with bone graft    COLONOSCOPY  01/17/2014    Aidee- sigmoid diverticula, due in 2024    DILATION AND CURETTAGE OF UTERUS      x 5    DILATION AND CURETTAGE OF UTERUS      LUMBAR DISC SURGERY  2011    Bohinski    LUMBAR SPINE SURGERY  08/2015    Isai      Social History     Socioeconomic History    Marital status:      Spouse name: None    Number of children: None    Years of education: None    Highest education level: None   Tobacco Use    Smoking status: Never    Smokeless tobacco: Never    Tobacco comments:     Previous social smoker   Vaping Use    Vaping Use: Never used   Substance and Sexual Activity    Alcohol use: Yes     Comment: rarely    Drug use: No     Social Determinants of Health     Financial Resource Strain: Low Risk  (3/1/2023)    Overall Financial Resource

## 2024-01-12 NOTE — ANESTHESIA PRE PROCEDURE
2.0 11/27/2023 04:08 PM    LABGLOM 50 11/27/2023 04:08 PM    GLUCOSE 89 11/27/2023 04:08 PM    PROT 7.0 11/27/2023 04:08 PM    PROT 7.2 08/05/2011 09:02 PM    CALCIUM 10.6 11/27/2023 04:08 PM    BILITOT 0.4 11/27/2023 04:08 PM    ALKPHOS 80 11/27/2023 04:08 PM    AST 19 11/27/2023 04:08 PM    ALT 21 11/27/2023 04:08 PM     BMP    Lab Results   Component Value Date/Time     11/27/2023 04:08 PM    K 5.0 11/27/2023 04:08 PM     11/27/2023 04:08 PM    CO2 30 11/27/2023 04:08 PM    BUN 19 11/27/2023 04:08 PM    CREATININE 1.1 11/27/2023 04:08 PM    CALCIUM 10.6 11/27/2023 04:08 PM    GFRAA >60 07/18/2022 08:25 AM    GFRAA >60 08/05/2011 09:02 PM    LABGLOM 50 11/27/2023 04:08 PM    GLUCOSE 89 11/27/2023 04:08 PM     POCGlucose  No results for input(s): \"GLUCOSE\" in the last 72 hours.   Coags    Lab Results   Component Value Date/Time    PROTIME 10.2 08/05/2011 09:02 PM    INR 0.94 08/05/2011 09:02 PM    APTT 29.4 08/05/2011 09:02 PM     HCG (If Applicable) No results found for: \"PREGTESTUR\", \"PREGSERUM\", \"HCG\", \"HCGQUANT\"   ABGs No results found for: \"PHART\", \"PO2ART\", \"GOM9LMA\", \"NGM5FDB\", \"BEART\", \"M4EADQJL\"   Type & Screen (If Applicable)  No results found for: \"LABABO\", \"LABRH\"                         BMI: Wt Readings from Last 3 Encounters:       NPO Status:                          Anesthesia Evaluation  Patient summary reviewed   no history of anesthetic complications:   Airway: Mallampati: II  TM distance: >3 FB   Neck ROM: full     Dental:    (+) partials      Pulmonary:Negative Pulmonary ROS and normal exam        (-) COPD, recent URI and sleep apnea                           Cardiovascular:Negative CV ROS  Exercise tolerance: good (>4 METS)  (+) hyperlipidemia    (-) hypertension, CAD and  CHF      Rhythm: regular             Beta Blocker:  Not on Beta Blocker         Neuro/Psych:   Negative Neuro/Psych ROS     (-) seizures and CVA           GI/Hepatic/Renal: Neg GI/Hepatic/Renal ROS  (+) GERD:

## 2024-01-12 NOTE — ANESTHESIA POSTPROCEDURE EVALUATION
Department of Anesthesiology  Postprocedure Note    Patient: Ashwini Sánchez  MRN: [id number]  YOB: 1940  Date of evaluation: 1/12/2024    Procedure Summary       Date: 01/12/24 Room / Location: [place] [address] / [facility]    Anesthesia Start: 1154 Anesthesia Stop: 1210    Procedure: EGD ESOPHAGOGASTRODUODENOSCOPY Diagnosis:       Abdominal pain, epigastric      (Abdominal pain, epigastric [R10.13])    Surgeons: Ronny Hoskins MD Responsible Provider: Sherita Abbott MD    Anesthesia Type: MAC ASA Status: 2            Anesthesia Type: No value filed.    Molly Phase I: Molly Score: 10    Molly Phase II: Molly Score: 9    Anesthesia Post Evaluation    Patient location during evaluation: bedside  Patient participation: complete - patient participated  Level of consciousness: awake and alert  Pain score: 0  Airway patency: patent  Nausea & Vomiting: no vomiting  Cardiovascular status: blood pressure returned to baseline  Respiratory status: acceptable  Hydration status: euvolemic  Pain management: adequate    No notable events documented.

## 2024-01-12 NOTE — PROGRESS NOTES
Patient verbalized understanding of discharge instructions, medications given, and potential complications including pain. Patient instructed to call Doctor if complications occur.

## 2024-01-12 NOTE — OP NOTE
EGD PROCEDURE NOTE         Esophagogastroduodenoscopy Procedure Note     Patient: Ashwini Sánchez MRN: [id number]   YOB: 1940 Age: 83 y.o. Sex: female       Admitting Physician: RONNY HOSKINS     Primary Care Physician: Dragan Santana MD      DATE OF PROCEDURE: 1/12/2024  PROCEDURE: Esophagogastroduodenoscopy  INDICATION: This is a 83 y.o. year old female who presents today with upper abdominal pain  ENDOSCOPIST: Ronny Hoskins MD    POSTOPERATIVE DIAGNOSIS:    1.  Negative EGD    Discussion: The patient's abdominal pain seems to be consistent with constipation    PLAN:    1.  Continue fiber supplementation daily      INFORMED CONSENT:  Informed consent for esophagogastoduodenoscopy was obtained.  The benefits and risks including adverse medicine reaction have been explained.  The patient's questions were answered and the patient agreed to proceed.    ASA:  ASA 2 - Patient with mild systemic disease with no functional limitations    SEDATION: MAC     The patient's vital signs, cardiac status, pulmonary status, abdominal status and mental status were stable for the procedure.  The patient's vitals signs and respiratory function as monitored by oxygen saturation were stable throughout    Procedure Details:    The Olympus videoendoscope was inserted into the mouth and carefully passed into the esophagus, through the stomach and to the distal duodenum.  Antegrade and retrograde examination of the upper gi tract was carefully performed.    Findings:   The esophagus is normal.  The z-line is distinct without lesions or irregularities.  There is no evidence of Alonso's esophagus.  The scope easily passed into the stomach.  The mucosa of the cardia, fundus, body and antrum of the stomach is normal.  The pylorus is patent and of normal contour.  The scope easily advanced into the duodenal bulb and down to the distal duodenum.  Ante-and retrograde exam of the duodenum is normal.    Gastric

## 2024-01-12 NOTE — DISCHARGE INSTRUCTIONS
Endoscopy Discharge Instructions    Call Dr @EXT@ with any questions or concerns.    You may be drowsy or lightheaded after receiving sedation.  DO NOT operate  a vehicle (automobile, bicycle, motorcycle, machinery, or power tools), no  alcoholic beverages, and do not make any important decisions today.                 Plan on bed rest or quiet relaxation today.  Resume normal activities in the morning.     Resume normal activity tomorrow unless otherwise advised by your physician.            Eat a light first meal, avoiding spicy and fatty foods, then resume normal diet unless you are told otherwise by your physician.    If the intravenous medication site is painful, apply warm compresses on the site until the soreness is relieved and elevate the arm above the heart. Call your physician if no improvement  in 2-3 days.       POSSIBLE SYMPTOMS TO WATCH:     1. fever (greater than 100) 5. increased abdominal bloating   2. severe pain   6. excessive bleeding   3. nausea and vomiting  7. chest pain   4. chills    8. shortness of breath       Notify Dr @EXT@ if these problems occur     Expected as normal and remedies:  Sore throat: use over the counter throat lozenges or gargle with warm salt water.  Redness or soreness at the IV site: apply warm compress  Gaseous discomfort: belching or passing flatus (gas).    -Follow up with me.    Ronny Hoskins MD    With questions or concerns, call Dr @EXT@ at .

## 2024-02-05 NOTE — PROGRESS NOTES
[facility] ENDOSCOPY COLONOSCOPY PRE-OPERATIVE INSTRUCTIONS    Procedure date_2/9/2024________  Arrival time___0730_________          Surgery time__0830__________       Clear liquids the day before the procedure. Do not eat or drink anything within 5 hours of your procedure.    This includes water chewing gum, mints and ice chips.   You may brush your teeth and gargle the morning of your surgery, but do not swallow the water    You may be asked to stop blood thinners such as Coumadin, Plavix, Fragmin, Lovenox, etc., or any anti-inflammatories such as:  Aspirin, Ibuprofen, Advil, Naproxen prior to your procedure.   We also ask that you stop any OTC medications such as fish oil, vitamin E, glucosamine, garlic, Multivitamins, COQ 10, etc.    You must make arrangements for a responsible adult to arrive with you and stay in our waiting area during your procedure.  They will also need to take you home after your procedure.    For your safety you will not be allowed to leave alone or drive yourself home.    Also for your safety, it is strongly suggested that someone stay with you the first 24 hours after your procedure.    For your comfort, please wear simple loose fitting clothing to the center.  Please do not bring valuables.      If you have a living will and a durable power of  for healthcare, please bring in a copy.     You will need to bring a photo ID and insurance card    Our goal is to provide you with excellent care so if you have any questions, please contact us at the [facility] at [phone]         Please note these are generalized instructions for all colonoscopy cases, you may be provided with more specific instructions if necessary

## 2024-02-06 ENCOUNTER — ANESTHESIA EVENT (OUTPATIENT)
Dept: ENDOSCOPY | Age: 84
End: 2024-02-06
Payer: MEDICARE

## 2024-02-09 ENCOUNTER — HOSPITAL ENCOUNTER (OUTPATIENT)
Age: 84
Setting detail: OUTPATIENT SURGERY
Discharge: HOME | End: 2024-02-09
Attending: INTERNAL MEDICINE | Admitting: INTERNAL MEDICINE
Payer: MEDICARE

## 2024-02-09 ENCOUNTER — ANESTHESIA (OUTPATIENT)
Dept: ENDOSCOPY | Age: 84
End: 2024-02-09
Payer: MEDICARE

## 2024-02-09 VITALS
HEART RATE: 70 BPM | TEMPERATURE: 97.1 F | WEIGHT: 110.4 LBS | BODY MASS INDEX: 21.68 KG/M2 | DIASTOLIC BLOOD PRESSURE: 75 MMHG | SYSTOLIC BLOOD PRESSURE: 129 MMHG | HEIGHT: 60 IN | RESPIRATION RATE: 16 BRPM | OXYGEN SATURATION: 98 %

## 2024-02-09 PROCEDURE — 3609027000 HC COLONOSCOPY: Performed by: INTERNAL MEDICINE

## 2024-02-09 PROCEDURE — 2580000003 HC RX 258: Performed by: STUDENT IN AN ORGANIZED HEALTH CARE EDUCATION/TRAINING PROGRAM

## 2024-02-09 PROCEDURE — 7100000010 HC PHASE II RECOVERY - FIRST 15 MIN: Performed by: INTERNAL MEDICINE

## 2024-02-09 PROCEDURE — 7100000011 HC PHASE II RECOVERY - ADDTL 15 MIN: Performed by: INTERNAL MEDICINE

## 2024-02-09 PROCEDURE — 2500000003 HC RX 250 WO HCPCS: Performed by: NURSE ANESTHETIST, CERTIFIED REGISTERED

## 2024-02-09 PROCEDURE — 6360000002 HC RX W HCPCS: Performed by: NURSE ANESTHETIST, CERTIFIED REGISTERED

## 2024-02-09 PROCEDURE — 3700000001 HC ADD 15 MINUTES (ANESTHESIA): Performed by: INTERNAL MEDICINE

## 2024-02-09 PROCEDURE — 3700000000 HC ANESTHESIA ATTENDED CARE: Performed by: INTERNAL MEDICINE

## 2024-02-09 RX ORDER — SODIUM CHLORIDE 0.9 % (FLUSH) 0.9 %
5-40 SYRINGE (ML) INJECTION PRN
Status: DISCONTINUED | OUTPATIENT
Start: 2024-02-09 | End: 2024-02-09 | Stop reason: HOSPADM

## 2024-02-09 RX ORDER — SODIUM CHLORIDE 0.9 % (FLUSH) 0.9 %
5-40 SYRINGE (ML) INJECTION EVERY 12 HOURS SCHEDULED
Status: DISCONTINUED | OUTPATIENT
Start: 2024-02-09 | End: 2024-02-09 | Stop reason: HOSPADM

## 2024-02-09 RX ORDER — SODIUM CHLORIDE 9 MG/ML
INJECTION, SOLUTION INTRAVENOUS PRN
Status: DISCONTINUED | OUTPATIENT
Start: 2024-02-09 | End: 2024-02-09 | Stop reason: HOSPADM

## 2024-02-09 RX ORDER — LIDOCAINE HYDROCHLORIDE 20 MG/ML
INJECTION, SOLUTION EPIDURAL; INFILTRATION; INTRACAUDAL; PERINEURAL PRN
Status: DISCONTINUED | OUTPATIENT
Start: 2024-02-09 | End: 2024-02-09 | Stop reason: SDUPTHER

## 2024-02-09 RX ORDER — PROPOFOL 10 MG/ML
INJECTION, EMULSION INTRAVENOUS PRN
Status: DISCONTINUED | OUTPATIENT
Start: 2024-02-09 | End: 2024-02-09 | Stop reason: SDUPTHER

## 2024-02-09 RX ADMIN — SODIUM CHLORIDE: 9 INJECTION, SOLUTION INTRAVENOUS at 08:09

## 2024-02-09 RX ADMIN — PROPOFOL 140 MCG/KG/MIN: 10 INJECTION, EMULSION INTRAVENOUS at 08:28

## 2024-02-09 RX ADMIN — LIDOCAINE HYDROCHLORIDE 60 MG: 20 INJECTION, SOLUTION EPIDURAL; INFILTRATION; INTRACAUDAL; PERINEURAL at 08:26

## 2024-02-09 RX ADMIN — PROPOFOL 60 MG: 10 INJECTION, EMULSION INTRAVENOUS at 08:26

## 2024-02-09 RX ADMIN — PROPOFOL 50 MG: 10 INJECTION, EMULSION INTRAVENOUS at 08:31

## 2024-02-09 ASSESSMENT — PAIN - FUNCTIONAL ASSESSMENT
PAIN_FUNCTIONAL_ASSESSMENT: 0-10
PAIN_FUNCTIONAL_ASSESSMENT: FACE, LEGS, ACTIVITY, CRY, AND CONSOLABILITY (FLACC)
PAIN_FUNCTIONAL_ASSESSMENT: 0-10
PAIN_FUNCTIONAL_ASSESSMENT: 0-10

## 2024-02-09 ASSESSMENT — PAIN DESCRIPTION - DESCRIPTORS: DESCRIPTORS: PRESSURE

## 2024-02-09 NOTE — OP NOTE
COLONOSCOPY     Patient: Ashwini Sánchez MRN: [id number]   YOB: 1940 Age: 83 y.o. Sex: female       Admitting Physician: RONNY MARIA     Primary Care Physician: Dragan Santana MD      DATE OF PROCEDURE: 2/9/2024  PROCEDURE: Colonoscopy    PREOPERATIVE DIAGNOSIS: Abdominal pain, epigastric [R10.13]  HPI: This is a 83 y.o. year old female who presents today for abdominal pain.    ENDOSCOPIST: Ronny Maria MD    POSTOPERATIVE DIAGNOSIS:    1.  Sigmoid diverticulosis  2.  Hemorrhoids    PLAN:   1.  Continue fiber supplement daily and MiraLAX as needed  2.  Dicyclomine 10 mg every 6 hours as needed  3.  Follow-up as needed    INFORMED CONSENT:  Informed consent for colonoscopy was obtained.  The benefits and risks including adverse medicine reaction and perforation have been explained.  The patient's questions were answered and the patient agreed to proceed.    ASA: ASA 2 - Patient with mild systemic disease with no functional limitations     SEDATION: MAC    The patient's vital signs, cardiac status, pulmonary status, abdominal status and mental status were stable for the procedure. The patient's vital signs and respiratory function as monitored by oxygen saturation remained stable.    COLON PREPARATION:  The patient was given a split colon preparation and the preparation was adequate.    Procedure Details:    An anal exam was performed and this was unremarkable. A digital rectal exam was performed and no masses palpated. The Olympus videocolonoscope  was inserted in the rectum and carefully advanced to the cecum as identified by IC valve, crow's foot appearance and appendix. The cecum was photodocumented.  The colonoscope was slowly withdrawn and retrograde examination of the colon was carefully performed with inspection around and between folds. The ascending colon and cecum were intubated twice with repeat antegrade and retrograde examination.  Retroflexion in the rectum was  performed.   Cecum Intubated: Yes    Findings:       There are diverticula in the sigmoid colon.    On retroflexion of the scope in the rectum, there are hemorrhoids noted.      There are no significant polyps or tumors.    Estimated Blood Loss:  None  Complications: None    Signed By: Ronny Hoskins MD

## 2024-02-09 NOTE — ANESTHESIA PRE PROCEDURE
Department of Anesthesiology  Preprocedure Note       Name:  Ashwini Sánchez   Age:  83 y.o.  :  1940                                          MRN:  [id number]         Date:  2024      Surgeon: Surgeon(s):  Ronny Hoskins MD    Procedure: Procedure(s):  COLONOSCOPY DIAGNOSTIC    Medications prior to admission:   Prior to Admission medications    Medication Sig Start Date End Date Taking? Authorizing Provider   psyllium (KONSYL) 28.3 % PACK Take 1 packet by mouth daily   Yes Sulma Taylor MD   Polyethylene Glycol 3350 (MIRALAX PO) Take by mouth daily  Patient not taking: Reported on 2024    Sulma Taylor MD   omeprazole (PRILOSEC) 20 MG delayed release capsule Take 1 capsule by mouth every morning (before breakfast) 23   Dragan Santana MD   acetaminophen (TYLENOL) 500 MG tablet Take 1 tablet by mouth as needed    Sulma Taylor MD   Calcium Carbonate-Vitamin D 600-400 MG-UNIT TABS Take  by mouth. One po bid     Sulma Taylor MD       Current medications:    No current facility-administered medications for this encounter.     Current Outpatient Medications   Medication Sig Dispense Refill    psyllium (KONSYL) 28.3 % PACK Take 1 packet by mouth daily      Polyethylene Glycol 3350 (MIRALAX PO) Take by mouth daily (Patient not taking: Reported on 2024)      omeprazole (PRILOSEC) 20 MG delayed release capsule Take 1 capsule by mouth every morning (before breakfast) 90 capsule 3    acetaminophen (TYLENOL) 500 MG tablet Take 1 tablet by mouth as needed      Calcium Carbonate-Vitamin D 600-400 MG-UNIT TABS Take  by mouth. One po bid          Allergies:  No Known Allergies    Problem List:    Patient Active Problem List   Diagnosis Code    Hyperlipidemia E78.5    Back pain M54.9    Chronic renal disease, stage III (HCC) [903865] N18.30       Past Medical History:        Diagnosis Date    ACL tear     not repaired-left leg    Back pain     CKD (chronic kidney

## 2024-02-09 NOTE — H&P
Gastroenterology Outpatient History and Physical     Patient: Ashwini Sánchez MRN: [id number] Sex: female   YOB: 1940 Age: 83 y.o. Location: [facility]    Date:2/9/2024  Primary Care Physician: Dragan Santana MD         Patient: Ashwini Sánchez    Physician: Ronny Hoskins MD    History of Present Illness: abdominal pain  Review of Systems:  Weight Loss: No  Dysphagia: No  Dyspepsia: No  History:  Past Medical History:   Diagnosis Date    ACL tear     not repaired-left leg    Back pain     CKD (chronic kidney disease)     GERD (gastroesophageal reflux disease)     Osteoarthritis     PONV (postoperative nausea and vomiting)     Prolonged emergence from general anesthesia       Past Surgical History:   Procedure Laterality Date    BREAST SURGERY Left 1996    Biopsy    BUNIONECTOMY Left 04/22/2019    LEFT FOOT BUNION CORRECTION, DISTAL CHEVRON AND DISTAL SOFT TISSUE RELEASE, LEFT FOOT SECOND HAMMERTOE CORRECTION WITH SMART TOE, WEIL OSTEOTOMY, ANGULAR DEFORMITY CORRECTION WITH MINI C-ARM performed by Mateo Hurt MD at [facility] OR    CARPAL TUNNEL RELEASE Right 04/04/2019    CATARACT REMOVAL Bilateral     CERVICAL SPINE SURGERY  2005    Anterior cervical Discectomy    CERVICAL SPINE SURGERY  03/14/2018    Isai-anterior cervical discectomy C3-4 with bone graft    COLONOSCOPY  01/17/2014    Kindel- sigmoid diverticula, due in 2024    DILATION AND CURETTAGE OF UTERUS      x 5    DILATION AND CURETTAGE OF UTERUS      LUMBAR DISC SURGERY  2011    Arnavinski    LUMBAR SPINE SURGERY  08/2015    Isai    UPPER GASTROINTESTINAL ENDOSCOPY N/A 01/12/2024    Kindel- Normal      Social History     Socioeconomic History    Marital status:      Spouse name: None    Number of children: None    Years of education: None    Highest education level: None   Tobacco Use    Smoking status: Never    Smokeless tobacco: Never    Tobacco comments:     Previous social smoker   Vaping Use

## 2024-02-09 NOTE — ANESTHESIA POSTPROCEDURE EVALUATION
Department of Anesthesiology  Postprocedure Note    Patient: Ashwini Sánchez  MRN: [id number]  YOB: 1940  Date of evaluation: 2/9/2024    Procedure Summary       Date: 02/09/24 Room / Location: [facility] 02 / [facility]    Anesthesia Start: 0821 Anesthesia Stop: 0845    Procedure: COLONOSCOPY DIAGNOSTIC Diagnosis:       Abdominal pain, epigastric      (Abdominal pain, epigastric [R10.13])    Surgeons: Ronny Hoskins MD Responsible Provider: Joe Mota MD    Anesthesia Type: MAC ASA Status: 2            Anesthesia Type: No value filed.    Molly Phase I: Molly Score: 10    Molly Phase II: Molly Score: 10    Anesthesia Post Evaluation    Patient location during evaluation: PACU  Patient participation: complete - patient participated  Level of consciousness: awake  Airway patency: patent  Nausea & Vomiting: no nausea and no vomiting  Cardiovascular status: blood pressure returned to baseline  Respiratory status: acceptable  Hydration status: stable  Comments: Vital signs stable  OK to discharge from Stage I post anesthesia care.  Care transferred from Anesthesiology department on discharge from perioperative area   Multimodal analgesia pain management approach  Pain management: satisfactory to patient    No notable events documented.

## 2024-07-17 LAB
ALBUMIN SERPL-MCNC: 4.6 G/DL (ref 3.4–5)
ALBUMIN/GLOB SERPL: 2 {RATIO} (ref 1.1–2.2)
ALP SERPL-CCNC: 86 U/L (ref 40–129)
ALT SERPL-CCNC: 13 U/L (ref 10–40)
ANION GAP SERPL CALCULATED.3IONS-SCNC: 11 MMOL/L (ref 3–16)
AST SERPL-CCNC: 16 U/L (ref 15–37)
BILIRUB SERPL-MCNC: 0.4 MG/DL (ref 0–1)
BUN SERPL-MCNC: 15 MG/DL (ref 7–20)
CALCIUM SERPL-MCNC: 10.5 MG/DL (ref 8.3–10.6)
CHLORIDE SERPL-SCNC: 102 MMOL/L (ref 99–110)
CO2 SERPL-SCNC: 28 MMOL/L (ref 21–32)
CREAT SERPL-MCNC: 1.1 MG/DL (ref 0.6–1.2)
DEPRECATED RDW RBC AUTO: 13.3 % (ref 12.4–15.4)
GFR SERPLBLD CREATININE-BSD FMLA CKD-EPI: 50 ML/MIN/{1.73_M2}
GLUCOSE SERPL-MCNC: 99 MG/DL (ref 70–99)
HCT VFR BLD AUTO: 41.1 % (ref 36–48)
HGB BLD-MCNC: 13.6 G/DL (ref 12–16)
MCH RBC QN AUTO: 30.1 PG (ref 26–34)
MCHC RBC AUTO-ENTMCNC: 33.2 G/DL (ref 31–36)
MCV RBC AUTO: 90.6 FL (ref 80–100)
PLATELET # BLD AUTO: 289 K/UL (ref 135–450)
PMV BLD AUTO: 8.1 FL (ref 5–10.5)
POTASSIUM SERPL-SCNC: 4.6 MMOL/L (ref 3.5–5.1)
PROT SERPL-MCNC: 6.9 G/DL (ref 6.4–8.2)
RBC # BLD AUTO: 4.54 M/UL (ref 4–5.2)
SODIUM SERPL-SCNC: 141 MMOL/L (ref 136–145)
WBC # BLD AUTO: 5.7 K/UL (ref 4–11)

## 2024-12-18 LAB
ALBUMIN SERPL-MCNC: 4.6 G/DL (ref 3.4–5)
ALBUMIN/GLOB SERPL: 2.1 {RATIO} (ref 1.1–2.2)
ALP SERPL-CCNC: 99 U/L (ref 40–129)
ALT SERPL-CCNC: 19 U/L (ref 10–40)
ANION GAP SERPL CALCULATED.3IONS-SCNC: 10 MMOL/L (ref 3–16)
AST SERPL-CCNC: 21 U/L (ref 15–37)
BILIRUB SERPL-MCNC: 0.3 MG/DL (ref 0–1)
BUN SERPL-MCNC: 13 MG/DL (ref 7–20)
CALCIUM SERPL-MCNC: 9.9 MG/DL (ref 8.3–10.6)
CHLORIDE SERPL-SCNC: 106 MMOL/L (ref 99–110)
CO2 SERPL-SCNC: 28 MMOL/L (ref 21–32)
CREAT SERPL-MCNC: 0.9 MG/DL (ref 0.6–1.2)
DEPRECATED RDW RBC AUTO: 14 % (ref 12.4–15.4)
GFR SERPLBLD CREATININE-BSD FMLA CKD-EPI: 64 ML/MIN/{1.73_M2}
GLUCOSE SERPL-MCNC: 79 MG/DL (ref 70–99)
HCT VFR BLD AUTO: 40.9 % (ref 36–48)
HGB BLD-MCNC: 13.7 G/DL (ref 12–16)
MCH RBC QN AUTO: 30.4 PG (ref 26–34)
MCHC RBC AUTO-ENTMCNC: 33.6 G/DL (ref 31–36)
MCV RBC AUTO: 90.5 FL (ref 80–100)
PLATELET # BLD AUTO: 295 K/UL (ref 135–450)
PMV BLD AUTO: 8.3 FL (ref 5–10.5)
POTASSIUM SERPL-SCNC: 4.5 MMOL/L (ref 3.5–5.1)
PROT SERPL-MCNC: 6.8 G/DL (ref 6.4–8.2)
RBC # BLD AUTO: 4.51 M/UL (ref 4–5.2)
SODIUM SERPL-SCNC: 144 MMOL/L (ref 136–145)
WBC # BLD AUTO: 6.2 K/UL (ref 4–11)

## 2025-06-14 ENCOUNTER — HOSPITAL ENCOUNTER (OUTPATIENT)
Dept: MRI IMAGING | Age: 85
Discharge: HOME | End: 2025-06-14
Attending: INTERNAL MEDICINE
Payer: MEDICARE

## 2025-06-14 DIAGNOSIS — R41.89 COGNITIVE IMPAIRMENT: ICD-10-CM

## 2025-06-14 PROCEDURE — 70551 MRI BRAIN STEM W/O DYE: CPT

## 2025-09-03 ENCOUNTER — APPOINTMENT (OUTPATIENT)
Dept: CT IMAGING | Age: 85
DRG: 694 | End: 2025-09-03
Payer: MEDICARE

## 2025-09-03 ENCOUNTER — HOSPITAL ENCOUNTER (INPATIENT)
Age: 85
LOS: 1 days | Discharge: HOME | DRG: 694 | End: 2025-09-04
Attending: INTERNAL MEDICINE | Admitting: INTERNAL MEDICINE
Payer: MEDICARE
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
## 2025-09-03 DIAGNOSIS — N13.30 HYDRONEPHROSIS OF RIGHT KIDNEY: ICD-10-CM

## 2025-09-03 DIAGNOSIS — R33.9 URINARY RETENTION: Primary | ICD-10-CM

## 2025-09-03 DIAGNOSIS — N13.4 HYDROURETER, RIGHT: ICD-10-CM

## 2025-09-03 DIAGNOSIS — S01.01XA LACERATION OF SCALP, INITIAL ENCOUNTER: ICD-10-CM

## 2025-09-03 LAB
ANION GAP SERPL CALCULATED.3IONS-SCNC: 12 MMOL/L (ref 3–16)
BASOPHILS # BLD: 0.1 K/UL (ref 0–0.2)
BASOPHILS NFR BLD: 1 %
BILIRUB UR QL STRIP.AUTO: NEGATIVE
BUN SERPL-MCNC: 10 MG/DL (ref 7–20)
CALCIUM SERPL-MCNC: 8.8 MG/DL (ref 8.3–10.6)
CHLORIDE SERPL-SCNC: 99 MMOL/L (ref 99–110)
CLARITY UR: CLEAR
CO2 SERPL-SCNC: 25 MMOL/L (ref 21–32)
COLOR UR: YELLOW
CREAT SERPL-MCNC: 0.9 MG/DL (ref 0.6–1.2)
DEPRECATED RDW RBC AUTO: 12.3 % (ref 12.4–15.4)
EOSINOPHIL # BLD: 0.2 K/UL (ref 0–0.6)
EOSINOPHIL NFR BLD: 2.6 %
GFR SERPLBLD CREATININE-BSD FMLA CKD-EPI: 63 ML/MIN/{1.73_M2}
GLUCOSE SERPL-MCNC: 93 MG/DL (ref 70–99)
GLUCOSE UR STRIP.AUTO-MCNC: NEGATIVE MG/DL
HCT VFR BLD AUTO: 39.7 % (ref 36–48)
HGB BLD-MCNC: 13.4 G/DL (ref 12–16)
HGB UR QL STRIP.AUTO: NEGATIVE
KETONES UR STRIP.AUTO-MCNC: NEGATIVE MG/DL
LEUKOCYTE ESTERASE UR QL STRIP.AUTO: NEGATIVE
LYMPHOCYTES # BLD: 1.3 K/UL (ref 1–5.1)
LYMPHOCYTES NFR BLD: 21 %
MCH RBC QN AUTO: 29.9 PG (ref 26–34)
MCHC RBC AUTO-ENTMCNC: 33.8 G/DL (ref 31–36)
MCV RBC AUTO: 88.4 FL (ref 80–100)
MONOCYTES # BLD: 0.4 K/UL (ref 0–1.3)
MONOCYTES NFR BLD: 7 %
NEUTROPHILS # BLD: 4.2 K/UL (ref 1.7–7.7)
NEUTROPHILS NFR BLD: 68.4 %
NITRITE UR QL STRIP.AUTO: NEGATIVE
PH UR STRIP.AUTO: 7.5 [PH] (ref 5–8)
PLATELET # BLD AUTO: 286 K/UL (ref 135–450)
PMV BLD AUTO: 7.3 FL (ref 5–10.5)
POTASSIUM SERPL-SCNC: 4.1 MMOL/L (ref 3.5–5.1)
PROT UR STRIP.AUTO-MCNC: NEGATIVE MG/DL
RBC # BLD AUTO: 4.48 M/UL (ref 4–5.2)
SODIUM SERPL-SCNC: 136 MMOL/L (ref 136–145)
SP GR UR STRIP.AUTO: 1.01 (ref 1–1.03)
UA COMPLETE W REFLEX CULTURE PNL UR: NORMAL
UA DIPSTICK W REFLEX MICRO PNL UR: NORMAL
URN SPEC COLLECT METH UR: NORMAL
UROBILINOGEN UR STRIP-ACNC: 0.2 E.U./DL
WBC # BLD AUTO: 6.2 K/UL (ref 4–11)

## 2025-09-03 PROCEDURE — 90715 TDAP VACCINE 7 YRS/> IM: CPT | Performed by: NURSE PRACTITIONER

## 2025-09-03 PROCEDURE — 1200000000 HC SEMI PRIVATE

## 2025-09-03 PROCEDURE — 36415 COLL VENOUS BLD VENIPUNCTURE: CPT

## 2025-09-03 PROCEDURE — 85025 COMPLETE CBC W/AUTO DIFF WBC: CPT

## 2025-09-03 PROCEDURE — 0HQ0XZZ REPAIR SCALP SKIN, EXTERNAL APPROACH: ICD-10-PCS | Performed by: INTERNAL MEDICINE

## 2025-09-03 PROCEDURE — 74177 CT ABD & PELVIS W/CONTRAST: CPT

## 2025-09-03 PROCEDURE — 70450 CT HEAD/BRAIN W/O DYE: CPT

## 2025-09-03 PROCEDURE — 72125 CT NECK SPINE W/O DYE: CPT

## 2025-09-03 PROCEDURE — 6360000002 HC RX W HCPCS: Performed by: NURSE PRACTITIONER

## 2025-09-03 PROCEDURE — 80048 BASIC METABOLIC PNL TOTAL CA: CPT

## 2025-09-03 PROCEDURE — 6360000004 HC RX CONTRAST MEDICATION: Performed by: NURSE PRACTITIONER

## 2025-09-03 PROCEDURE — 81003 URINALYSIS AUTO W/O SCOPE: CPT

## 2025-09-03 PROCEDURE — 99285 EMERGENCY DEPT VISIT HI MDM: CPT

## 2025-09-03 PROCEDURE — 90471 IMMUNIZATION ADMIN: CPT | Performed by: NURSE PRACTITIONER

## 2025-09-03 PROCEDURE — 12001 RPR S/N/AX/GEN/TRNK 2.5CM/<: CPT

## 2025-09-03 RX ORDER — MAGNESIUM SULFATE IN WATER 40 MG/ML
2000 INJECTION, SOLUTION INTRAVENOUS PRN
Status: DISCONTINUED | OUTPATIENT
Start: 2025-09-03 | End: 2025-09-04 | Stop reason: HOSPADM

## 2025-09-03 RX ORDER — ONDANSETRON 4 MG/1
4 TABLET, ORALLY DISINTEGRATING ORAL EVERY 8 HOURS PRN
Status: DISCONTINUED | OUTPATIENT
Start: 2025-09-03 | End: 2025-09-04 | Stop reason: HOSPADM

## 2025-09-03 RX ORDER — SODIUM CHLORIDE 9 MG/ML
INJECTION, SOLUTION INTRAVENOUS PRN
Status: DISCONTINUED | OUTPATIENT
Start: 2025-09-03 | End: 2025-09-04 | Stop reason: HOSPADM

## 2025-09-03 RX ORDER — POTASSIUM CHLORIDE 1500 MG/1
40 TABLET, EXTENDED RELEASE ORAL PRN
Status: DISCONTINUED | OUTPATIENT
Start: 2025-09-03 | End: 2025-09-04 | Stop reason: HOSPADM

## 2025-09-03 RX ORDER — DONEPEZIL HYDROCHLORIDE 10 MG/1
10 TABLET, FILM COATED ORAL NIGHTLY
Status: DISCONTINUED | OUTPATIENT
Start: 2025-09-04 | End: 2025-09-04 | Stop reason: HOSPADM

## 2025-09-03 RX ORDER — ENOXAPARIN SODIUM 100 MG/ML
30 INJECTION SUBCUTANEOUS DAILY
Status: DISCONTINUED | OUTPATIENT
Start: 2025-09-04 | End: 2025-09-04 | Stop reason: HOSPADM

## 2025-09-03 RX ORDER — ENOXAPARIN SODIUM 100 MG/ML
40 INJECTION SUBCUTANEOUS DAILY
Status: DISCONTINUED | OUTPATIENT
Start: 2025-09-04 | End: 2025-09-03 | Stop reason: DRUGHIGH

## 2025-09-03 RX ORDER — ACETAMINOPHEN 325 MG/1
650 TABLET ORAL EVERY 6 HOURS PRN
Status: DISCONTINUED | OUTPATIENT
Start: 2025-09-03 | End: 2025-09-04 | Stop reason: HOSPADM

## 2025-09-03 RX ORDER — ONDANSETRON 2 MG/ML
4 INJECTION INTRAMUSCULAR; INTRAVENOUS EVERY 6 HOURS PRN
Status: DISCONTINUED | OUTPATIENT
Start: 2025-09-03 | End: 2025-09-04 | Stop reason: HOSPADM

## 2025-09-03 RX ORDER — POTASSIUM CHLORIDE 7.45 MG/ML
10 INJECTION INTRAVENOUS PRN
Status: DISCONTINUED | OUTPATIENT
Start: 2025-09-03 | End: 2025-09-04 | Stop reason: HOSPADM

## 2025-09-03 RX ORDER — LIDOCAINE HYDROCHLORIDE AND EPINEPHRINE BITARTRATE 20; .01 MG/ML; MG/ML
20 INJECTION, SOLUTION SUBCUTANEOUS ONCE
Status: COMPLETED | OUTPATIENT
Start: 2025-09-03 | End: 2025-09-03

## 2025-09-03 RX ORDER — CALCIUM CARBONATE 500(1250)
500 TABLET ORAL DAILY
COMMUNITY

## 2025-09-03 RX ORDER — ESCITALOPRAM OXALATE 10 MG/1
5 TABLET ORAL DAILY
Status: DISCONTINUED | OUTPATIENT
Start: 2025-09-04 | End: 2025-09-04 | Stop reason: HOSPADM

## 2025-09-03 RX ORDER — SODIUM CHLORIDE 0.9 % (FLUSH) 0.9 %
5-40 SYRINGE (ML) INJECTION PRN
Status: DISCONTINUED | OUTPATIENT
Start: 2025-09-03 | End: 2025-09-04 | Stop reason: HOSPADM

## 2025-09-03 RX ORDER — POLYETHYLENE GLYCOL 3350 17 G/17G
17 POWDER, FOR SOLUTION ORAL DAILY PRN
Status: DISCONTINUED | OUTPATIENT
Start: 2025-09-03 | End: 2025-09-04 | Stop reason: HOSPADM

## 2025-09-03 RX ORDER — SODIUM CHLORIDE 0.9 % (FLUSH) 0.9 %
5-40 SYRINGE (ML) INJECTION EVERY 12 HOURS SCHEDULED
Status: DISCONTINUED | OUTPATIENT
Start: 2025-09-03 | End: 2025-09-04 | Stop reason: HOSPADM

## 2025-09-03 RX ORDER — ACETAMINOPHEN 650 MG/1
650 SUPPOSITORY RECTAL EVERY 6 HOURS PRN
Status: DISCONTINUED | OUTPATIENT
Start: 2025-09-03 | End: 2025-09-04 | Stop reason: HOSPADM

## 2025-09-03 RX ORDER — IOPAMIDOL 755 MG/ML
75 INJECTION, SOLUTION INTRAVASCULAR
Status: COMPLETED | OUTPATIENT
Start: 2025-09-03 | End: 2025-09-03

## 2025-09-03 RX ORDER — MEMANTINE HYDROCHLORIDE 5 MG/1
5 TABLET ORAL 2 TIMES DAILY
Status: DISCONTINUED | OUTPATIENT
Start: 2025-09-04 | End: 2025-09-04 | Stop reason: HOSPADM

## 2025-09-03 RX ADMIN — IOPAMIDOL 75 ML: 755 INJECTION, SOLUTION INTRAVENOUS at 20:31

## 2025-09-03 RX ADMIN — LIDOCAINE HYDROCHLORIDE,EPINEPHRINE BITARTRATE 20 ML: 20; .01 INJECTION, SOLUTION INFILTRATION; PERINEURAL at 18:53

## 2025-09-03 RX ADMIN — TETANUS TOXOID, REDUCED DIPHTHERIA TOXOID AND ACELLULAR PERTUSSIS VACCINE, ADSORBED 0.5 ML: 5; 2.5; 8; 8; 2.5 SUSPENSION INTRAMUSCULAR at 17:25

## 2025-09-03 ASSESSMENT — PAIN DESCRIPTION - LOCATION: LOCATION: HEAD

## 2025-09-03 ASSESSMENT — PAIN - FUNCTIONAL ASSESSMENT: PAIN_FUNCTIONAL_ASSESSMENT: 0-10

## 2025-09-03 ASSESSMENT — ENCOUNTER SYMPTOMS: ABDOMINAL PAIN: 1

## 2025-09-03 ASSESSMENT — PAIN SCALES - GENERAL
PAINLEVEL_OUTOF10: 0
PAINLEVEL_OUTOF10: 6

## 2025-09-03 ASSESSMENT — LIFESTYLE VARIABLES
HOW MANY STANDARD DRINKS CONTAINING ALCOHOL DO YOU HAVE ON A TYPICAL DAY: PATIENT DOES NOT DRINK
HOW OFTEN DO YOU HAVE A DRINK CONTAINING ALCOHOL: NEVER

## 2025-09-03 ASSESSMENT — PAIN DESCRIPTION - PAIN TYPE: TYPE: ACUTE PAIN

## 2025-09-03 ASSESSMENT — PAIN DESCRIPTION - FREQUENCY: FREQUENCY: CONTINUOUS

## 2025-09-03 ASSESSMENT — PAIN DESCRIPTION - DESCRIPTORS: DESCRIPTORS: BURNING

## 2025-09-04 VITALS
OXYGEN SATURATION: 98 % | HEART RATE: 53 BPM | HEIGHT: 60 IN | RESPIRATION RATE: 18 BRPM | WEIGHT: 112.08 LBS | DIASTOLIC BLOOD PRESSURE: 69 MMHG | BODY MASS INDEX: 22 KG/M2 | SYSTOLIC BLOOD PRESSURE: 137 MMHG | TEMPERATURE: 97.5 F

## 2025-09-04 PROBLEM — S01.81XA FOREHEAD LACERATION, INITIAL ENCOUNTER: Status: ACTIVE | Noted: 2025-09-04

## 2025-09-04 PROBLEM — R93.89 THICKENED ENDOMETRIUM: Status: ACTIVE | Noted: 2025-09-04

## 2025-09-04 PROBLEM — N13.30 HYDRONEPHROSIS OF RIGHT KIDNEY: Status: ACTIVE | Noted: 2025-09-04

## 2025-09-04 PROCEDURE — 97530 THERAPEUTIC ACTIVITIES: CPT

## 2025-09-04 PROCEDURE — 94760 N-INVAS EAR/PLS OXIMETRY 1: CPT

## 2025-09-04 PROCEDURE — 6360000002 HC RX W HCPCS: Performed by: STUDENT IN AN ORGANIZED HEALTH CARE EDUCATION/TRAINING PROGRAM

## 2025-09-04 PROCEDURE — 2500000003 HC RX 250 WO HCPCS: Performed by: STUDENT IN AN ORGANIZED HEALTH CARE EDUCATION/TRAINING PROGRAM

## 2025-09-04 PROCEDURE — 99235 HOSP IP/OBS SAME DATE MOD 70: CPT | Performed by: INTERNAL MEDICINE

## 2025-09-04 PROCEDURE — 51798 US URINE CAPACITY MEASURE: CPT

## 2025-09-04 PROCEDURE — 6370000000 HC RX 637 (ALT 250 FOR IP): Performed by: NURSE PRACTITIONER

## 2025-09-04 PROCEDURE — 97161 PT EVAL LOW COMPLEX 20 MIN: CPT

## 2025-09-04 PROCEDURE — 6370000000 HC RX 637 (ALT 250 FOR IP): Performed by: STUDENT IN AN ORGANIZED HEALTH CARE EDUCATION/TRAINING PROGRAM

## 2025-09-04 RX ORDER — TAMSULOSIN HYDROCHLORIDE 0.4 MG/1
0.4 CAPSULE ORAL DAILY
Status: DISCONTINUED | OUTPATIENT
Start: 2025-09-04 | End: 2025-09-04 | Stop reason: HOSPADM

## 2025-09-04 RX ORDER — DICYCLOMINE HYDROCHLORIDE 10 MG/1
10 CAPSULE ORAL 4 TIMES DAILY PRN
COMMUNITY

## 2025-09-04 RX ORDER — TAMSULOSIN HYDROCHLORIDE 0.4 MG/1
0.4 CAPSULE ORAL DAILY
Qty: 30 CAPSULE | Refills: 3 | Status: SHIPPED | OUTPATIENT
Start: 2025-09-05

## 2025-09-04 RX ADMIN — MEMANTINE 5 MG: 5 TABLET ORAL at 08:49

## 2025-09-04 RX ADMIN — SODIUM CHLORIDE, PRESERVATIVE FREE 10 ML: 5 INJECTION INTRAVENOUS at 00:50

## 2025-09-04 RX ADMIN — SODIUM CHLORIDE, PRESERVATIVE FREE 10 ML: 5 INJECTION INTRAVENOUS at 08:49

## 2025-09-04 RX ADMIN — ENOXAPARIN SODIUM 30 MG: 100 INJECTION SUBCUTANEOUS at 08:48

## 2025-09-04 RX ADMIN — ACETAMINOPHEN 650 MG: 325 TABLET ORAL at 00:47

## 2025-09-04 RX ADMIN — TAMSULOSIN HYDROCHLORIDE 0.4 MG: 0.4 CAPSULE ORAL at 14:15

## 2025-09-04 RX ADMIN — MEMANTINE 5 MG: 5 TABLET ORAL at 00:47

## 2025-09-04 RX ADMIN — DONEPEZIL HYDROCHLORIDE 10 MG: 10 TABLET, FILM COATED ORAL at 00:47

## 2025-09-04 RX ADMIN — ESCITALOPRAM OXALATE 5 MG: 10 TABLET ORAL at 08:49

## 2025-09-04 RX ADMIN — PSYLLIUM HUSK 1 PACKET: 3.4 POWDER ORAL at 08:49

## 2025-09-04 ASSESSMENT — PAIN SCALES - GENERAL
PAINLEVEL_OUTOF10: 0
PAINLEVEL_OUTOF10: 0

## (undated) DEVICE — DRAPE,U/SHT,SPLIT,FILM,60X84,STERILE: Brand: MEDLINE

## (undated) DEVICE — STRIP,CLOSURE,WOUND,MEDI-STRIP,1/2X4: Brand: MEDLINE

## (undated) DEVICE — SUTURE MCRYL SZ 4-0 L18IN ABSRB UD L19MM PS-2 3/8 CIR PRIM Y496G

## (undated) DEVICE — NON-THREADED KWIRE
Type: IMPLANTABLE DEVICE | Site: FOOT | Status: NON-FUNCTIONAL
Brand: MINI
Removed: 2019-04-22

## (undated) DEVICE — SUTURE ETHBND EXCEL SZ 2-0 L36IN NONABSORBABLE GRN L26MM SH X523H

## (undated) DEVICE — KIT OR ROOM TURNOVER W/STRAP

## (undated) DEVICE — CHLORAPREP 26ML ORANGE

## (undated) DEVICE — PADDING UNDERCAST W4INXL4YD 100% COT CRIMPED FINISH WBRL II

## (undated) DEVICE — Z DISCONTINUED USE 2275686 GLOVE SURG SZ 8 L12IN FNGR THK13MIL WHT ISOLEX POLYISOPRENE

## (undated) DEVICE — ELECTROSURGICAL PENCIL BUTTON SWITCH NON COATED BLADE ELECTRODE 10 FT (3 M) CORD HOLSTER: Brand: MEGADYNE

## (undated) DEVICE — GLOVE SURG SZ 6 L12IN FNGR THK87MIL DK GRN LTX FREE ISOLEX

## (undated) DEVICE — ELECTRODE PT RET AD L9FT HI MOIST COND ADH HYDRGEL CORDED

## (undated) DEVICE — GLOVE SURG SZ 8 L12IN FNGR THK87MIL WHT LTX FREE

## (undated) DEVICE — BANDAGE COMPR W4INXL15FT BGE E SGL LAYERED CLP CLSR

## (undated) DEVICE — INTENDED FOR TISSUE SEPARATION, AND OTHER PROCEDURES THAT REQUIRE A SHARP SURGICAL BLADE TO PUNCTURE OR CUT.: Brand: BARD-PARKER ® STAINLESS STEEL BLADES

## (undated) DEVICE — SOLUTION IV IRRIG POUR BRL 0.9% SODIUM CHL 2F7124

## (undated) DEVICE — CANISTER, RIGID, 1200CC: Brand: MEDLINE INDUSTRIES, INC.

## (undated) DEVICE — STANDARD HYPODERMIC NEEDLE,POLYPROPYLENE HUB: Brand: MONOJECT

## (undated) DEVICE — SYRINGE IRRIG 60ML SFT PLIABLE BLB EZ TO GRP 1 HND USE W/

## (undated) DEVICE — 3M™ STERI-STRIP™ COMPOUND BENZOIN TINCTURE 40 BAGS/CARTON 4 CARTONS/CASE C1544: Brand: 3M™ STERI-STRIP™

## (undated) DEVICE — DISTAL RASP: Brand: SMART TOE

## (undated) DEVICE — 3.0MM DEPTH GAUGE/ COUNTERSINK: Brand: MINI

## (undated) DEVICE — PRECISION THIN (7.0 X 0.38 X 29.5MM)

## (undated) DEVICE — SUTURE VCRL SZ 3-0 L18IN ABSRB UD L26MM SH 1/2 CIR J864D

## (undated) DEVICE — DRESSING,GAUZE,XEROFORM,CURAD,1"X8",ST: Brand: CURAD

## (undated) DEVICE — TOWEL,OR,DSP,ST,BLUE,STD,4/PK,20PK/CS: Brand: MEDLINE

## (undated) DEVICE — TUBING, SUCTION, 1/4" X 12', STRAIGHT: Brand: MEDLINE

## (undated) DEVICE — SUTURE NONABSORBABLE MONOFILAMENT 4-0 FS-2 18 IN ETHILON 662H

## (undated) DEVICE — STANDARD DRILL BIT , AO

## (undated) DEVICE — BANDAGE COMPR W4INXL12FT E DISP ESMARCH EVEN

## (undated) DEVICE — SUTURE VCRL SZ 2-0 L18IN ABSRB UD CT-1 L36MM 1/2 CIR J839D

## (undated) DEVICE — SYRINGE MED 10ML LUERLOCK TIP W/O SFTY DISP

## (undated) DEVICE — ZIMMER® STERILE DISPOSABLE TOURNIQUET CUFF WITH PLC, DUAL PORT, SINGLE BLADDER, 18 IN. (46 CM)

## (undated) DEVICE — Z DISCONTINUED PER MEDLINE (LOW STOCK)  USE 2422770 DRAPE C ARM W54XL78IN FOR FLROSCN

## (undated) DEVICE — T-DRAPE,EXTREMITY,STERILE: Brand: MEDLINE

## (undated) DEVICE — PROXIMAL RASP: Brand: SMART TOE

## (undated) DEVICE — CANNULATED DRILL BIT: Brand: MINI

## (undated) DEVICE — GLOVE SURG SZ 6 L12IN FNGR THK87MIL WHT LTX FREE

## (undated) DEVICE — MINOR SET UP PK

## (undated) DEVICE — COVER LT HNDL BLU PLAS

## (undated) DEVICE — SPONGE GZ W4XL4IN COT 12 PLY TYP VII WVN C FLD DSGN

## (undated) DEVICE — SHEET,DRAPE,53X77,STERILE: Brand: MEDLINE

## (undated) DEVICE — GOWN SIRUS NONREIN XL W/TWL: Brand: MEDLINE INDUSTRIES, INC.